# Patient Record
Sex: MALE | Race: WHITE | Employment: OTHER
[De-identification: names, ages, dates, MRNs, and addresses within clinical notes are randomized per-mention and may not be internally consistent; named-entity substitution may affect disease eponyms.]

---

## 2017-02-23 ENCOUNTER — TELEPHONE (OUTPATIENT)
Dept: OTHER | Facility: CLINIC | Age: 82
End: 2017-02-23

## 2017-03-14 ENCOUNTER — OFFICE VISIT (OUTPATIENT)
Dept: INTERNAL MEDICINE | Age: 82
End: 2017-03-14

## 2017-03-14 ENCOUNTER — TELEPHONE (OUTPATIENT)
Dept: INTERNAL MEDICINE | Age: 82
End: 2017-03-14

## 2017-03-14 VITALS
SYSTOLIC BLOOD PRESSURE: 128 MMHG | HEIGHT: 67 IN | DIASTOLIC BLOOD PRESSURE: 74 MMHG | WEIGHT: 145 LBS | BODY MASS INDEX: 22.76 KG/M2

## 2017-03-14 DIAGNOSIS — E11.65 TYPE 2 DIABETES MELLITUS WITH HYPERGLYCEMIA, WITH LONG-TERM CURRENT USE OF INSULIN (HCC): Primary | ICD-10-CM

## 2017-03-14 DIAGNOSIS — Z79.4 TYPE 2 DIABETES MELLITUS WITH HYPERGLYCEMIA, WITH LONG-TERM CURRENT USE OF INSULIN (HCC): Primary | ICD-10-CM

## 2017-03-14 DIAGNOSIS — I10 ESSENTIAL HYPERTENSION: ICD-10-CM

## 2017-03-14 DIAGNOSIS — E78.2 MIXED HYPERLIPIDEMIA: ICD-10-CM

## 2017-03-14 PROCEDURE — 4040F PNEUMOC VAC/ADMIN/RCVD: CPT | Performed by: INTERNAL MEDICINE

## 2017-03-14 PROCEDURE — G8484 FLU IMMUNIZE NO ADMIN: HCPCS | Performed by: INTERNAL MEDICINE

## 2017-03-14 PROCEDURE — 99214 OFFICE O/P EST MOD 30 MIN: CPT | Performed by: INTERNAL MEDICINE

## 2017-03-14 PROCEDURE — G8419 CALC BMI OUT NRM PARAM NOF/U: HCPCS | Performed by: INTERNAL MEDICINE

## 2017-03-14 PROCEDURE — 1036F TOBACCO NON-USER: CPT | Performed by: INTERNAL MEDICINE

## 2017-03-14 PROCEDURE — 1123F ACP DISCUSS/DSCN MKR DOCD: CPT | Performed by: INTERNAL MEDICINE

## 2017-03-14 PROCEDURE — G8427 DOCREV CUR MEDS BY ELIG CLIN: HCPCS | Performed by: INTERNAL MEDICINE

## 2017-03-14 ASSESSMENT — ENCOUNTER SYMPTOMS
EYE REDNESS: 0
NAUSEA: 0
BACK PAIN: 0
ABDOMINAL PAIN: 0
SHORTNESS OF BREATH: 0
CHEST TIGHTNESS: 0

## 2017-03-14 ASSESSMENT — PATIENT HEALTH QUESTIONNAIRE - PHQ9
SUM OF ALL RESPONSES TO PHQ QUESTIONS 1-9: 0
SUM OF ALL RESPONSES TO PHQ9 QUESTIONS 1 & 2: 0
1. LITTLE INTEREST OR PLEASURE IN DOING THINGS: 0
2. FEELING DOWN, DEPRESSED OR HOPELESS: 0

## 2017-06-14 ENCOUNTER — OFFICE VISIT (OUTPATIENT)
Dept: INTERNAL MEDICINE | Age: 82
End: 2017-06-14

## 2017-06-14 VITALS
BODY MASS INDEX: 20.72 KG/M2 | DIASTOLIC BLOOD PRESSURE: 84 MMHG | WEIGHT: 132 LBS | SYSTOLIC BLOOD PRESSURE: 122 MMHG | HEIGHT: 67 IN

## 2017-06-14 DIAGNOSIS — I10 ESSENTIAL HYPERTENSION: ICD-10-CM

## 2017-06-14 DIAGNOSIS — R53.83 OTHER FATIGUE: ICD-10-CM

## 2017-06-14 DIAGNOSIS — E11.65 TYPE 2 DIABETES MELLITUS WITH HYPERGLYCEMIA, WITH LONG-TERM CURRENT USE OF INSULIN (HCC): Primary | ICD-10-CM

## 2017-06-14 DIAGNOSIS — Z79.4 TYPE 2 DIABETES MELLITUS WITH HYPERGLYCEMIA, WITH LONG-TERM CURRENT USE OF INSULIN (HCC): Primary | ICD-10-CM

## 2017-06-14 DIAGNOSIS — R63.4 WEIGHT LOSS, ABNORMAL: ICD-10-CM

## 2017-06-14 DIAGNOSIS — E78.2 MIXED HYPERLIPIDEMIA: ICD-10-CM

## 2017-06-14 DIAGNOSIS — R97.20 ABNORMAL PSA: ICD-10-CM

## 2017-06-14 PROCEDURE — 1036F TOBACCO NON-USER: CPT | Performed by: INTERNAL MEDICINE

## 2017-06-14 PROCEDURE — G8427 DOCREV CUR MEDS BY ELIG CLIN: HCPCS | Performed by: INTERNAL MEDICINE

## 2017-06-14 PROCEDURE — G8420 CALC BMI NORM PARAMETERS: HCPCS | Performed by: INTERNAL MEDICINE

## 2017-06-14 PROCEDURE — 4040F PNEUMOC VAC/ADMIN/RCVD: CPT | Performed by: INTERNAL MEDICINE

## 2017-06-14 PROCEDURE — 1123F ACP DISCUSS/DSCN MKR DOCD: CPT | Performed by: INTERNAL MEDICINE

## 2017-06-14 PROCEDURE — 99214 OFFICE O/P EST MOD 30 MIN: CPT | Performed by: INTERNAL MEDICINE

## 2017-06-14 ASSESSMENT — ENCOUNTER SYMPTOMS
ABDOMINAL PAIN: 0
BACK PAIN: 0
NAUSEA: 0
SHORTNESS OF BREATH: 0
CHEST TIGHTNESS: 0
EYE REDNESS: 0

## 2017-06-16 LAB
A/G RATIO: 1.3 (CALC) (ref 1–2.5)
ALBUMIN SERPL-MCNC: 4 G/DL (ref 3.6–5.1)
ALP BLD-CCNC: 79 U/L (ref 40–115)
ALT SERPL-CCNC: 17 U/L (ref 9–46)
AMORPHOUS: ABNORMAL /HPF
AST SERPL-CCNC: 25 U/L (ref 10–35)
ATYPICAL LYMPHOCYTE RELATIVE PERCENT: ABNORMAL % (ref 0–10)
BACTERIA: ABNORMAL /HPF
BAND NEUTROPHILS: ABNORMAL %
BANDED NEUTROPHILS ABSOLUTE COUNT: ABNORMAL CELLS/UL (ref 0–750)
BASOPHILS ABSOLUTE: 68 CELLS/UL (ref 0–200)
BASOPHILS RELATIVE PERCENT: 0.9 %
BILIRUB SERPL-MCNC: 0.8 MG/DL (ref 0.2–1.2)
BILIRUBIN URINE: NEGATIVE
BLASTS ABSOLUTE COUNT: ABNORMAL CELLS/UL
BLASTS RELATIVE PERCENT: ABNORMAL %
BLOOD, URINE: NEGATIVE
BUN / CREAT RATIO: ABNORMAL (CALC) (ref 6–22)
BUN BLDV-MCNC: 14 MG/DL (ref 7–25)
CALCIUM OXALATE CRYSTALS: ABNORMAL /HPF
CALCIUM SERPL-MCNC: 9.2 MG/DL (ref 8.6–10.3)
CASTS: ABNORMAL /LPF
CHLORIDE BLD-SCNC: 97 MMOL/L (ref 98–110)
CLARITY: CLEAR
CO2: 31 MMOL/L (ref 20–31)
COLOR: YELLOW
COMMENT: ABNORMAL
CREAT SERPL-MCNC: 1.11 MG/DL (ref 0.7–1.11)
CREATININE URINE: 101 MG/DL (ref 20–370)
CRYSTALS: ABNORMAL /HPF
EOSINOPHILS ABSOLUTE: 129 CELLS/UL (ref 15–500)
EOSINOPHILS RELATIVE PERCENT: 1.7 %
EPITHELIAL CELLS, UA: ABNORMAL /HPF
GFR AFRICAN AMERICAN: 72 ML/MIN/1.73M2
GFR SERPL CREATININE-BSD FRML MDRD: 62 ML/MIN/1.73M2
GLOBULIN: 3.1 G/DL (CALC) (ref 1.9–3.7)
GLUCOSE BLD-MCNC: 262 MG/DL (ref 65–99)
GLUCOSE URINE: ABNORMAL
GRANULAR CASTS: ABNORMAL /LPF
HBA1C MFR BLD: 8.3 % OF TOTAL HGB
HCT VFR BLD CALC: 38 % (ref 38.5–50)
HEMOGLOBIN: 12.6 G/DL (ref 13.2–17.1)
HYALINE CASTS: ABNORMAL /LPF
KETONES, URINE: NEGATIVE
LEUKOCYTE ESTERASE, URINE: NEGATIVE
LEUKOCYTES, UA: ABNORMAL /HPF
LYMPHOCYTES ABSOLUTE: 1672 CELLS/UL (ref 850–3900)
LYMPHOCYTES RELATIVE PERCENT: 22 %
MCH RBC QN AUTO: 29.7 PG (ref 27–33)
MCHC RBC AUTO-ENTMCNC: 33.2 G/DL (ref 32–36)
MCV RBC AUTO: 89.6 FL (ref 80–100)
METAMYELOCYTES ABSOLUTE COUNT: ABNORMAL CELLS/UL
METAMYELOCYTES RELATIVE PERCENT: ABNORMAL %
MICROALBUMIN UR-MCNC: 24.8 MG/DL
MICROALBUMIN/CREAT UR-RTO: 246 MCG/MG CREAT
MONOCYTES ABSOLUTE: 646 CELLS/UL (ref 200–950)
MONOCYTES RELATIVE PERCENT: 8.5 %
MYELOCYTE PERCENT: ABNORMAL %
MYELOCYTES ABSOLUTE COUNT: ABNORMAL CELLS/UL
NEUTROPHILS ABSOLUTE: 5084 CELLS/UL (ref 1500–7800)
NITRITE, URINE: NEGATIVE
NUCLEATED RED BLOOD CELLS: ABNORMAL /100 WBC
NUCLEATED RED BLOOD CELLS: ABNORMAL CELLS/UL
PDW BLD-RTO: 12.4 % (ref 11–15)
PH UA: 5.5 (ref 5–8)
PLATELET # BLD: 172 THOUSAND/UL (ref 140–400)
PMV BLD AUTO: 11.4 FL (ref 7.5–12.5)
POTASSIUM SERPL-SCNC: 4.6 MMOL/L (ref 3.5–5.3)
PROMYELOCYTES ABSOLUTE COUNT: ABNORMAL CELLS/UL
PROMYELOCYTES PERCENT: ABNORMAL %
PROSTATE SPECIFIC ANTIGEN: 0.2 NG/ML
PROTEIN UA: ABNORMAL
RBC # BLD: 4.24 MILLION/UL (ref 4.2–5.8)
RBC UA: ABNORMAL /HPF
REDUCING SUBSTANCES, URINE: ABNORMAL %
RENAL EPITHELIAL, POC: ABNORMAL /HPF
SEGMENTED NEUTROPHILS RELATIVE PERCENT: 66.9 %
SODIUM BLD-SCNC: 135 MMOL/L (ref 135–146)
SPECIFIC GRAVITY UA: 1.02 (ref 1–1.03)
T4 FREE: 1 NG/DL (ref 0.8–1.8)
TOTAL PROTEIN: 7.1 G/DL (ref 6.1–8.1)
TRANSITIONAL EPITHELIAL: ABNORMAL /HPF
TRIPLE PHOSPHATE CRYSTALS: ABNORMAL /HPF
TSH ULTRASENSITIVE: 2.81 MIU/L (ref 0.4–4.5)
URATE CRYSTALS, URINE: ABNORMAL /HPF
WBC # BLD: 7.6 THOUSAND/UL (ref 3.8–10.8)
YEAST: ABNORMAL /HPF

## 2017-09-19 ENCOUNTER — OFFICE VISIT (OUTPATIENT)
Dept: INTERNAL MEDICINE | Age: 82
End: 2017-09-19

## 2017-09-19 VITALS
HEART RATE: 53 BPM | WEIGHT: 128.6 LBS | BODY MASS INDEX: 19.49 KG/M2 | OXYGEN SATURATION: 98 % | DIASTOLIC BLOOD PRESSURE: 70 MMHG | RESPIRATION RATE: 16 BRPM | HEIGHT: 68 IN | SYSTOLIC BLOOD PRESSURE: 120 MMHG

## 2017-09-19 DIAGNOSIS — E78.2 MIXED HYPERLIPIDEMIA: ICD-10-CM

## 2017-09-19 DIAGNOSIS — Z23 NEED FOR INFLUENZA VACCINATION: ICD-10-CM

## 2017-09-19 DIAGNOSIS — I10 ESSENTIAL HYPERTENSION: ICD-10-CM

## 2017-09-19 DIAGNOSIS — H61.21 IMPACTED CERUMEN OF RIGHT EAR: ICD-10-CM

## 2017-09-19 DIAGNOSIS — Z79.4 TYPE 2 DIABETES MELLITUS WITH HYPERGLYCEMIA, WITH LONG-TERM CURRENT USE OF INSULIN (HCC): Primary | ICD-10-CM

## 2017-09-19 DIAGNOSIS — E11.65 TYPE 2 DIABETES MELLITUS WITH HYPERGLYCEMIA, WITH LONG-TERM CURRENT USE OF INSULIN (HCC): Primary | ICD-10-CM

## 2017-09-19 PROCEDURE — 90662 IIV NO PRSV INCREASED AG IM: CPT | Performed by: INTERNAL MEDICINE

## 2017-09-19 PROCEDURE — 1036F TOBACCO NON-USER: CPT | Performed by: INTERNAL MEDICINE

## 2017-09-19 PROCEDURE — 4040F PNEUMOC VAC/ADMIN/RCVD: CPT | Performed by: INTERNAL MEDICINE

## 2017-09-19 PROCEDURE — G8420 CALC BMI NORM PARAMETERS: HCPCS | Performed by: INTERNAL MEDICINE

## 2017-09-19 PROCEDURE — 1123F ACP DISCUSS/DSCN MKR DOCD: CPT | Performed by: INTERNAL MEDICINE

## 2017-09-19 PROCEDURE — G0008 ADMIN INFLUENZA VIRUS VAC: HCPCS | Performed by: INTERNAL MEDICINE

## 2017-09-19 PROCEDURE — 99214 OFFICE O/P EST MOD 30 MIN: CPT | Performed by: INTERNAL MEDICINE

## 2017-09-19 PROCEDURE — G8427 DOCREV CUR MEDS BY ELIG CLIN: HCPCS | Performed by: INTERNAL MEDICINE

## 2017-09-19 ASSESSMENT — ENCOUNTER SYMPTOMS
BACK PAIN: 0
NAUSEA: 0
ABDOMINAL PAIN: 0
CHEST TIGHTNESS: 0
EYE REDNESS: 0
SHORTNESS OF BREATH: 0

## 2017-09-23 LAB
BUN / CREAT RATIO: ABNORMAL (CALC) (ref 6–22)
BUN BLDV-MCNC: 15 MG/DL (ref 7–25)
CALCIUM SERPL-MCNC: 8.8 MG/DL (ref 8.6–10.3)
CHLORIDE BLD-SCNC: 97 MMOL/L (ref 98–110)
CO2: 29 MMOL/L (ref 20–31)
CREAT SERPL-MCNC: 1.11 MG/DL (ref 0.7–1.11)
GFR AFRICAN AMERICAN: 72 ML/MIN/1.73M2
GFR SERPL CREATININE-BSD FRML MDRD: 62 ML/MIN/1.73M2
GLUCOSE BLD-MCNC: 251 MG/DL (ref 65–99)
HBA1C MFR BLD: 8.3 % OF TOTAL HGB
POTASSIUM SERPL-SCNC: 3.9 MMOL/L (ref 3.5–5.3)
SODIUM BLD-SCNC: 136 MMOL/L (ref 135–146)

## 2017-09-27 RX ORDER — LEVOTHYROXINE SODIUM 0.05 MG/1
TABLET ORAL
Qty: 90 TABLET | Refills: 2 | Status: SHIPPED | OUTPATIENT
Start: 2017-09-27 | End: 2018-01-01

## 2017-10-12 ENCOUNTER — OFFICE VISIT (OUTPATIENT)
Dept: INTERNAL MEDICINE | Age: 82
End: 2017-10-12

## 2017-10-12 VITALS
WEIGHT: 124 LBS | HEIGHT: 67 IN | SYSTOLIC BLOOD PRESSURE: 128 MMHG | DIASTOLIC BLOOD PRESSURE: 78 MMHG | BODY MASS INDEX: 19.46 KG/M2

## 2017-10-12 DIAGNOSIS — J44.9 COPD, MODERATE (HCC): Primary | ICD-10-CM

## 2017-10-12 PROCEDURE — G8420 CALC BMI NORM PARAMETERS: HCPCS | Performed by: NURSE PRACTITIONER

## 2017-10-12 PROCEDURE — 4040F PNEUMOC VAC/ADMIN/RCVD: CPT | Performed by: NURSE PRACTITIONER

## 2017-10-12 PROCEDURE — G8427 DOCREV CUR MEDS BY ELIG CLIN: HCPCS | Performed by: NURSE PRACTITIONER

## 2017-10-12 PROCEDURE — G8926 SPIRO NO PERF OR DOC: HCPCS | Performed by: NURSE PRACTITIONER

## 2017-10-12 PROCEDURE — 3023F SPIROM DOC REV: CPT | Performed by: NURSE PRACTITIONER

## 2017-10-12 PROCEDURE — 99213 OFFICE O/P EST LOW 20 MIN: CPT | Performed by: NURSE PRACTITIONER

## 2017-10-12 PROCEDURE — G8484 FLU IMMUNIZE NO ADMIN: HCPCS | Performed by: NURSE PRACTITIONER

## 2017-10-12 PROCEDURE — 1123F ACP DISCUSS/DSCN MKR DOCD: CPT | Performed by: NURSE PRACTITIONER

## 2017-10-12 PROCEDURE — 1036F TOBACCO NON-USER: CPT | Performed by: NURSE PRACTITIONER

## 2017-10-12 RX ORDER — ALBUTEROL SULFATE 90 UG/1
2 AEROSOL, METERED RESPIRATORY (INHALATION) EVERY 6 HOURS PRN
Qty: 1 INHALER | Refills: 3 | Status: SHIPPED | OUTPATIENT
Start: 2017-10-12

## 2017-10-12 RX ORDER — BUDESONIDE AND FORMOTEROL FUMARATE DIHYDRATE 160; 4.5 UG/1; UG/1
2 AEROSOL RESPIRATORY (INHALATION) 2 TIMES DAILY
Qty: 1 INHALER | Refills: 3 | Status: SHIPPED | OUTPATIENT
Start: 2017-10-12 | End: 2018-01-01 | Stop reason: ALTCHOICE

## 2017-10-12 ASSESSMENT — ENCOUNTER SYMPTOMS
SINUS PAIN: 0
COUGH: 1
SHORTNESS OF BREATH: 1
WHEEZING: 0
SORE THROAT: 0
SINUS PRESSURE: 0
RHINORRHEA: 0

## 2017-10-12 NOTE — PROGRESS NOTES
Left Arm   Position: Sitting   Cuff Size: Medium Adult   Weight: 124 lb (56.2 kg)   Height: 5' 7\" (1.702 m)     Physical Exam   Constitutional: He appears well-developed and well-nourished. Cardiovascular: Normal rate, regular rhythm and normal heart sounds. Pulmonary/Chest: He has decreased breath sounds in the right upper field, the right middle field, the right lower field, the left upper field, the left middle field and the left lower field. Abdominal: Soft. Skin: Skin is warm and dry. Current Outpatient Prescriptions   Medication Sig Dispense Refill    budesonide-formoterol (SYMBICORT) 160-4.5 MCG/ACT AERO Inhale 2 puffs into the lungs 2 times daily 1 Inhaler 3    albuterol sulfate HFA (PROAIR HFA) 108 (90 Base) MCG/ACT inhaler Inhale 2 puffs into the lungs every 6 hours as needed for Wheezing 1 Inhaler 3    levothyroxine (SYNTHROID) 50 MCG tablet TAKE 1 TABLET BY MOUTH DAILY 90 tablet 2    hydrochlorothiazide (HYDRODIURIL) 25 MG tablet TAKE 1 TABLET BY MOUTH DAILY 90 tablet 3    insulin glargine (TOUJEO SOLOSTAR) 300 UNIT/ML injection pen Inject 4 Units into the skin nightly      clopidogrel (PLAVIX) 75 MG tablet TAKE 1 TABLET BY MOUTH DAILY 90 tablet 3    losartan (COZAAR) 100 MG tablet TAKE 1 TABLET BY MOUTH DAILY 90 tablet 3    SPIRIVA HANDIHALER 18 MCG inhalation capsule INHALE CONTENTS OF 1 CAPSULE VIA HANDIHALER DAILY 90 capsule 3    carvedilol (COREG) 6.25 MG tablet TAKE 1 TABLET BY MOUTH TWICE DAILY WITH MEALS 180 tablet 11    pravastatin (PRAVACHOL) 40 MG tablet TAKE 1 TABLET BY MOUTH EVERY EVENING 90 tablet 3    hydrALAZINE (APRESOLINE) 50 MG tablet Take 1 tablet by mouth 3 times daily. (Patient taking differently: Take 25 mg by mouth 2 times daily ) 90 tablet 3    insulin lispro (HUMALOG) 100 UNIT/ML injection Inject 5 Units into the skin 2 times daily (with meals) Sliding scale       No current facility-administered medications for this visit. Assessment:     1.  COPD,

## 2017-10-16 RX ORDER — CHOLESTYRAMINE 4 G/9G
POWDER, FOR SUSPENSION ORAL
Qty: 1 PACKET | Refills: 5 | Status: SHIPPED | OUTPATIENT
Start: 2017-10-16 | End: 2017-11-16 | Stop reason: HOSPADM

## 2017-10-27 RX ORDER — HYDROCHLOROTHIAZIDE 25 MG/1
TABLET ORAL
Qty: 90 TABLET | Refills: 2 | Status: SHIPPED | OUTPATIENT
Start: 2017-10-27 | End: 2018-01-01 | Stop reason: SDUPTHER

## 2017-11-07 ENCOUNTER — OFFICE VISIT (OUTPATIENT)
Dept: INTERNAL MEDICINE | Age: 82
End: 2017-11-07

## 2017-11-07 VITALS
HEART RATE: 60 BPM | WEIGHT: 116.8 LBS | SYSTOLIC BLOOD PRESSURE: 90 MMHG | RESPIRATION RATE: 12 BRPM | DIASTOLIC BLOOD PRESSURE: 68 MMHG | OXYGEN SATURATION: 98 % | BODY MASS INDEX: 18.29 KG/M2

## 2017-11-07 DIAGNOSIS — E46 PROTEIN-CALORIE MALNUTRITION, UNSPECIFIED SEVERITY (HCC): ICD-10-CM

## 2017-11-07 DIAGNOSIS — E03.4 HYPOTHYROIDISM DUE TO ACQUIRED ATROPHY OF THYROID: ICD-10-CM

## 2017-11-07 DIAGNOSIS — Z79.4 TYPE 2 DIABETES MELLITUS WITH HYPERGLYCEMIA, WITH LONG-TERM CURRENT USE OF INSULIN (HCC): Primary | ICD-10-CM

## 2017-11-07 DIAGNOSIS — E11.65 TYPE 2 DIABETES MELLITUS WITH HYPERGLYCEMIA, WITH LONG-TERM CURRENT USE OF INSULIN (HCC): Primary | ICD-10-CM

## 2017-11-07 DIAGNOSIS — I10 ESSENTIAL HYPERTENSION: ICD-10-CM

## 2017-11-07 DIAGNOSIS — R53.83 OTHER FATIGUE: ICD-10-CM

## 2017-11-07 DIAGNOSIS — F32.A DEPRESSION, UNSPECIFIED DEPRESSION TYPE: ICD-10-CM

## 2017-11-07 PROCEDURE — G8484 FLU IMMUNIZE NO ADMIN: HCPCS | Performed by: INTERNAL MEDICINE

## 2017-11-07 PROCEDURE — G8427 DOCREV CUR MEDS BY ELIG CLIN: HCPCS | Performed by: INTERNAL MEDICINE

## 2017-11-07 PROCEDURE — 1036F TOBACCO NON-USER: CPT | Performed by: INTERNAL MEDICINE

## 2017-11-07 PROCEDURE — 99214 OFFICE O/P EST MOD 30 MIN: CPT | Performed by: INTERNAL MEDICINE

## 2017-11-07 PROCEDURE — G8419 CALC BMI OUT NRM PARAM NOF/U: HCPCS | Performed by: INTERNAL MEDICINE

## 2017-11-07 PROCEDURE — 4040F PNEUMOC VAC/ADMIN/RCVD: CPT | Performed by: INTERNAL MEDICINE

## 2017-11-07 PROCEDURE — 1123F ACP DISCUSS/DSCN MKR DOCD: CPT | Performed by: INTERNAL MEDICINE

## 2017-11-07 RX ORDER — ESCITALOPRAM OXALATE 5 MG/1
5 TABLET ORAL DAILY
Qty: 30 TABLET | Refills: 3 | Status: SHIPPED | OUTPATIENT
Start: 2017-11-07 | End: 2018-01-01 | Stop reason: SDUPTHER

## 2017-11-07 RX ORDER — PRAVASTATIN SODIUM 40 MG
TABLET ORAL
Qty: 90 TABLET | Refills: 3 | Status: SHIPPED | OUTPATIENT
Start: 2017-11-07 | End: 2018-01-01

## 2017-11-07 ASSESSMENT — ENCOUNTER SYMPTOMS
SHORTNESS OF BREATH: 0
BACK PAIN: 0
ABDOMINAL PAIN: 0
CHEST TIGHTNESS: 0
NAUSEA: 0
EYE REDNESS: 0

## 2017-11-07 NOTE — PROGRESS NOTES
Subjective:      Patient ID: Albert Payne is a 81 y.o. male. Chief Complaint   Patient presents with    Fatigue     sleeping alot, laying down during the day, no walking for exercise, can't breathe, seems depressed     Weight Loss    Depression     HPI     He has been losing weight slowly over the last year or 2. 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 As he has lost weight he's become somewhat depressed. His wife says he does eat but he does not hold his weight. He denies polyuria or polydipsia. She does say that he is in the bathroom a lot. He does have some problem with loose stools. He has had poorly fitting dentures but they were recently repaired. Diabetes- A1C 8.3, denies polyuria, or polydipsia, no hypoglycemia, mild forgetfulness. Irritible, depressed, losing weight , eats pretty well , had his dentures relined, doesn't want to be left alone, doesn't like the dark. Hypertension- Stable on meds, no cp , some feeling of sob, sighing, no edema , no cough. Losartan and HCTZ.      Current Outpatient Prescriptions on File Prior to Visit   Medication Sig Dispense Refill    hydrochlorothiazide (HYDRODIURIL) 25 MG tablet TAKE 1 TABLET BY MOUTH DAILY 90 tablet 2    cholestyramine (QUESTRAN) 4 g packet TAKE 1 PACKET AS DIRECTED TWICE DAILY 1 packet 5    budesonide-formoterol (SYMBICORT) 160-4.5 MCG/ACT AERO Inhale 2 puffs into the lungs 2 times daily 1 Inhaler 3    albuterol sulfate HFA (PROAIR HFA) 108 (90 Base) MCG/ACT inhaler Inhale 2 puffs into the lungs every 6 hours as needed for Wheezing 1 Inhaler 3    levothyroxine (SYNTHROID) 50 MCG tablet TAKE 1 TABLET BY MOUTH DAILY 90 tablet 2    hydrochlorothiazide (HYDRODIURIL) 25 MG tablet TAKE 1 TABLET BY MOUTH DAILY 90 tablet 3    insulin glargine (TOUJEO SOLOSTAR) 300 UNIT/ML injection pen Inject 4 Units into the skin nightly      clopidogrel (PLAVIX) 75 MG tablet TAKE 1 TABLET BY MOUTH sounds are normal. He exhibits no distension and no mass. There is no tenderness. There is no rebound and no guarding. Musculoskeletal: He exhibits no edema. Neurological: He is alert and oriented to person, place, and time. A little forgetful    Skin: No rash noted. Psychiatric: He has a normal mood and affect. Assessment and plan       1. Type 2 diabetes mellitus with hyperglycemia, with long-term current use of insulin (HCC)  Chronic diabetes. Check diabetic labs. - Comprehensive Metabolic Panel; Future  - Hemoglobin A1C; Future    2. Essential hypertension  Stable hypertension. Check laboratories. - Comprehensive Metabolic Panel; Future    3. Hypothyroidism due to acquired atrophy of thyroid  History of hypothyroidism currently on therapy, recent tests stable. 4. Other fatigue  Worsening fatigue. Check laboratories. 5. Protein-calorie malnutrition, unspecified severity ([address]  Increased weight loss. Consultation with GI and evaluation of chemistries. - CBC; Future  - IRON AND TIBC; Future  - Roverto Bang MD ([place])    6. Depression, unspecified depression type  Increased depression. Start on Lexapro 5 mg daily. - escitalopram (LEXAPRO) 5 MG tablet; Take 1 tablet by mouth daily  Dispense: 30 tablet;  Refill:3

## 2017-11-09 LAB
A/G RATIO: 1.2 (CALC) (ref 1–2.5)
ALBUMIN SERPL-MCNC: 3.8 G/DL (ref 3.6–5.1)
ALP BLD-CCNC: 72 U/L (ref 40–115)
ALT SERPL-CCNC: 16 U/L (ref 9–46)
AST SERPL-CCNC: 21 U/L (ref 10–35)
ATYPICAL LYMPHOCYTE RELATIVE PERCENT: ABNORMAL % (ref 0–10)
BAND NEUTROPHILS: ABNORMAL %
BANDED NEUTROPHILS ABSOLUTE COUNT: ABNORMAL CELLS/UL (ref 0–750)
BASOPHILS ABSOLUTE: 70 CELLS/UL (ref 0–200)
BASOPHILS RELATIVE PERCENT: 0.7 %
BILIRUB SERPL-MCNC: 0.8 MG/DL (ref 0.2–1.2)
BLASTS ABSOLUTE COUNT: ABNORMAL CELLS/UL
BLASTS RELATIVE PERCENT: ABNORMAL %
BUN / CREAT RATIO: 14 (CALC) (ref 6–22)
BUN BLDV-MCNC: 16 MG/DL (ref 7–25)
CALCIUM SERPL-MCNC: 9.2 MG/DL (ref 8.6–10.3)
CHLORIDE BLD-SCNC: 99 MMOL/L (ref 98–110)
CO2: 31 MMOL/L (ref 20–31)
COMMENT: ABNORMAL
CREAT SERPL-MCNC: 1.17 MG/DL (ref 0.7–1.11)
EOSINOPHILS ABSOLUTE: 220 CELLS/UL (ref 15–500)
EOSINOPHILS RELATIVE PERCENT: 2.2 %
GFR AFRICAN AMERICAN: 67 ML/MIN/1.73M2
GFR SERPL CREATININE-BSD FRML MDRD: 58 ML/MIN/1.73M2
GLOBULIN: 3.2 G/DL (CALC) (ref 1.9–3.7)
GLUCOSE BLD-MCNC: 126 MG/DL (ref 65–99)
HBA1C MFR BLD: 7.9 % OF TOTAL HGB
HCT VFR BLD CALC: 37.5 % (ref 38.5–50)
HEMOGLOBIN: 13.1 G/DL (ref 13.2–17.1)
IRON SATURATION: 30 % (CALC) (ref 15–60)
IRON: 73 MCG/DL (ref 50–180)
LYMPHOCYTES ABSOLUTE: 1740 CELLS/UL (ref 850–3900)
LYMPHOCYTES RELATIVE PERCENT: 17.4 %
MCH RBC QN AUTO: 30.9 PG (ref 27–33)
MCHC RBC AUTO-ENTMCNC: 34.9 G/DL (ref 32–36)
MCV RBC AUTO: 88.4 FL (ref 80–100)
METAMYELOCYTES ABSOLUTE COUNT: ABNORMAL CELLS/UL
METAMYELOCYTES RELATIVE PERCENT: ABNORMAL %
MONOCYTES ABSOLUTE: 800 CELLS/UL (ref 200–950)
MONOCYTES RELATIVE PERCENT: 8 %
MYELOCYTE PERCENT: ABNORMAL %
MYELOCYTES ABSOLUTE COUNT: ABNORMAL CELLS/UL
NEUTROPHILS ABSOLUTE: 7170 CELLS/UL (ref 1500–7800)
NUCLEATED RED BLOOD CELLS: ABNORMAL /100 WBC
NUCLEATED RED BLOOD CELLS: ABNORMAL CELLS/UL
PDW BLD-RTO: 13.1 % (ref 11–15)
PLATELET # BLD: 178 THOUSAND/UL (ref 140–400)
PMV BLD AUTO: 11.8 FL (ref 7.5–12.5)
POTASSIUM SERPL-SCNC: 3.7 MMOL/L (ref 3.5–5.3)
PROMYELOCYTES ABSOLUTE COUNT: ABNORMAL CELLS/UL
PROMYELOCYTES PERCENT: ABNORMAL %
RBC # BLD: 4.24 MILLION/UL (ref 4.2–5.8)
SEGMENTED NEUTROPHILS RELATIVE PERCENT: 71.7 %
SODIUM BLD-SCNC: 138 MMOL/L (ref 135–146)
TOTAL IRON BINDING CAPACITY: 244 MCG/DL (CALC) (ref 250–425)
TOTAL PROTEIN: 7 G/DL (ref 6.1–8.1)
WBC # BLD: 10 THOUSAND/UL (ref 3.8–10.8)

## 2017-11-16 ENCOUNTER — HOSPITAL ENCOUNTER (OUTPATIENT)
Dept: ENDOSCOPY | Age: 82
Discharge: HOME | End: 2017-11-16
Attending: INTERNAL MEDICINE | Admitting: INTERNAL MEDICINE

## 2017-11-16 VITALS
DIASTOLIC BLOOD PRESSURE: 77 MMHG | SYSTOLIC BLOOD PRESSURE: 154 MMHG | BODY MASS INDEX: 17.58 KG/M2 | HEIGHT: 68 IN | OXYGEN SATURATION: 96 % | WEIGHT: 116 LBS | HEART RATE: 66 BPM | RESPIRATION RATE: 16 BRPM | TEMPERATURE: 97.4 F

## 2017-11-16 LAB
GLUCOSE BLD-MCNC: 150 MG/DL (ref 70–99)
PERFORMED ON: ABNORMAL

## 2017-11-16 RX ORDER — OMEPRAZOLE 40 MG/1
40 CAPSULE, DELAYED RELEASE ORAL DAILY
Qty: 30 CAPSULE | Refills: 3 | Status: SHIPPED | OUTPATIENT
Start: 2017-11-16 | End: 2018-01-01

## 2017-11-16 NOTE — H&P
Patient was examined and all pertinent data reviewed. Documentation was done in [facility]. Patient's condition was assessed to be stable for the procedure to proceed.     Arsh Zhang MD

## 2017-11-16 NOTE — PLAN OF CARE
ADMISSION PRE-PROCEDURE INTRA-PROCEDURE POST-PROCEDURE: RECOVERY/ DISCHARGE   ASSESSMENT &  EVALUATION CONSULTS [x] Verify patient identification, allergies, vital signs, NPO, IV, & SPO2  [x] Complete the CORRIE SCORE  [x] Consent form to treat signed  [x] History and Physical [x] Reassess patient after pre- procedure medication given  [x] GI physician evaluates pt  [x] Verify patient's name, allergies [x] Continuous monitoring of vital  signs, SPO2, LOC  [x] Emotional status  [x] Patient comfort level [x] Total system admission assessment with appropriate intervention  [x] Pain evaluation and management  [x] Discharge criteria met  [x] Discharge assessment with appropriate intervention  [x] Compare with pre-procedure status  [x] Discharge by appropriate physician   DIAGNOSTIC / TESTS [x]  Lab work ordered  [x]  Obtain and attach lab work to patient's chart  [x]  Report abnormals and F/U with physician [x] Assure needed test results are present [x] Diagnostic testing as indicated  [x] Obtained specimens sent to lab [x] Diagnostic testing as indicated    [x] Assess patient's gag reflex    MEDICATIONS [x]  Conscious sedation medications  explained to patient  [x]  Start IV per physician's order  and/or protocol  [x]  Verify compliance of the colon prep  []  Pre-procedure med. as ordered  [x] Verify compliance of colon prep. [x] Re-check IV access [x] Assist with administration of IV conscious sedation medication  [x] Start O2  per  nasal cannula, if needed   [x] IV fluids as indicated/ordered  [x] Administration of medications as ordered  [x] Medications as prescribed  [x] D/C O2 therapy as ordered   PROCEDURE/TREATMENT [x] Specific order by GI physician  [x] Specific procedure as scheduled  [x]  Verify procedure as ordered [x] Time out/procedure verification checklist complete.  [x] EGD/Colonoscopy and related procedures  [x] Assist physician with the procedure [x] Treatment as indicated   NUTRITION / DIET [x] NPO after midnight, as ordered [x] Verify NPO status [x] IV fluids as support [x] Clear liquids and/or ice chips as ordered  [x] Tolerating clear liquids  [x] Special diet as ordered  [x] D/C IV fluids   ACTIVITY  [x] Assess level of function  [x]  Specified by physician  [x]  Activity as tolerated [x] Position on left side [x] Position on left side and reposition patient as physician ordered [x] Gradually elevate HOB to restrepos position  [x] Position changes as patient tolerated  [x] Ambulate as pre-procedure   PATIENT / S.O. EDUCATION [x] Pre, Intra Post-procedure  teaching appropriate to procedure  [x] Conscious Sedation Teaching  [x] Pain Management - instructed [x] Encourage questions  [x] Clarify any concerns [x] Safety devices maintain to  prevent patient injury  [x] Assist and support patient  [x] Observe standard precautions [x] Physician confers with the family/S.O. [x] Short visit from family in [place] area  [x] Physician specific post-procedure orders  [x] S/S complications with proper [x]F/U; office visits F/U  [x] Review discharge instructions, medicine to family /S. O. [x] Medication/ special diet information given to family/ S.O. [x]  Copy of discharge instructions givn to family/S.O.   OUTCOME PLANNING  DISCHARGE PLAN [x] Patient/S. O. will verbalize understanding of admission procedure & expectations of outcome in realistic terms   [x] Patient verbalize the role of family/S. O. in plan of care  [x] Patient will have designated responsible person available for discharge.  [x] Patient will demonstrate an  understanding of the planned  procedure and its related procedures and conscious sedation [x] Patient will:  - receive services according to the           standards of care  - receive standards for conscious       sedation  - remain free of injury [x] Patient will:  - have stable vital signs based on Salty Score   - be pain free or tolerable, have no bleeding  - have minimal abdominal distention   - return to pre-procedure level of orientation   -  tolerate fluid with no nausea and vomiting  -  ambulate as pre-procedure ADL   - verbalize understanding of the discharge instructions     1 Wallace Gary

## 2017-11-16 NOTE — ANESTHESIA PRE-OP
1/27/14   Rebecca Mehta MD   insulin lispro (HUMALOG) 100 UNIT/ML injection Inject 5 Units into the skin 2 times daily (with meals) Sliding scale    Historical Provider, MD       Current medications:    Current Outpatient Prescriptions   Medication Sig Dispense Refill    escitalopram (LEXAPRO) 5 MG tablet Take 1 tablet by mouth daily 30 tablet 3    pravastatin (PRAVACHOL) 40 MG tablet TAKE 1 TABLET BY MOUTH EVERY EVENING 90 tablet 3    hydrochlorothiazide (HYDRODIURIL) 25 MG tablet TAKE 1 TABLET BY MOUTH DAILY 90 tablet 2    cholestyramine (QUESTRAN) 4 g packet TAKE 1 PACKET AS DIRECTED TWICE DAILY 1 packet 5    budesonide-formoterol (SYMBICORT) 160-4.5 MCG/ACT AERO Inhale 2 puffs into the lungs 2 times daily 1 Inhaler 3    albuterol sulfate HFA (PROAIR HFA) 108 (90 Base) MCG/ACT inhaler Inhale 2 puffs into the lungs every 6 hours as needed for Wheezing 1 Inhaler 3    levothyroxine (SYNTHROID) 50 MCG tablet TAKE 1 TABLET BY MOUTH DAILY 90 tablet 2    hydrochlorothiazide (HYDRODIURIL) 25 MG tablet TAKE 1 TABLET BY MOUTH DAILY 90 tablet 3    insulin glargine (TOUJEO SOLOSTAR) 300 UNIT/ML injection pen Inject 4 Units into the skin nightly      clopidogrel (PLAVIX) 75 MG tablet TAKE 1 TABLET BY MOUTH DAILY 90 tablet 3    losartan (COZAAR) 100 MG tablet TAKE 1 TABLET BY MOUTH DAILY 90 tablet 3    SPIRIVA HANDIHALER 18 MCG inhalation capsule INHALE CONTENTS OF 1 CAPSULE VIA HANDIHALER DAILY 90 capsule 3    carvedilol (COREG) 6.25 MG tablet TAKE 1 TABLET BY MOUTH TWICE DAILY WITH MEALS 180 tablet 11    hydrALAZINE (APRESOLINE) 50 MG tablet Take 1 tablet by mouth 3 times daily. (Patient taking differently: Take 25 mg by mouth 2 times daily ) 90 tablet 3    insulin lispro (HUMALOG) 100 UNIT/ML injection Inject 5 Units into the skin 2 times daily (with meals) Sliding scale       No current facility-administered medications for this encounter. Allergies:     Allergies   Allergen Reactions    Morphine And Related [Codeine] Nausea Only and Other (See Comments)     With morphine pt hallucinates         Problem List:    Patient Active Problem List   Diagnosis Code    Cholelithiasis K80.20    Type 2 diabetes mellitus with hyperglycemia ([facility] E11.65    Essential hypertension I10    Skin lesion L98.9    Hypothyroidism due to acquired atrophy of thyroid E03.4    Well adult exam Z00.00    Mild cognitive impairment G31.84    Excessive cerumen in ear canal H61.20    Hearing loss H91.90    Vitamin D deficiency E55.9    Mixed hyperlipidemia E78.2    Weight loss, abnormal R63.4    Vitamin B 12 deficiency E53.8       Past Medical History:        Diagnosis Date    COPD (chronic obstructive pulmonary disease) ([facility]     Diabetes mellitus ([facility] [address] [facility] 2007    Hyperlipidemia     Hypertension     Necrotizing pancreatitis 2007    Pancreatitis 2007       Past Surgical History:        Procedure Laterality Date    ABDOMEN SURGERY      PANCREAS SURGERY  2007    Dr. Lucas Doherty  2007       Social History:    Social History   Substance Use Topics    Smoking status: Former Smoker     Packs/day: 2.00     Years: 30.00     Types: Cigarettes     Quit date: 7/1/1980    Smokeless tobacco: Never Used    Alcohol use No                                Counseling given: Not Answered      Vital Signs (Current): There were no vitals filed for this visit. BP Readings from Last 3 Encounters:   11/07/17 90/68   10/12/17 128/78   09/19/17 120/70       NPO Status:  midnight                                                                               BMI:   Wt Readings from Last 3 Encounters:   11/07/17 116 lb 12.8 oz (53 kg)   10/12/17 124 lb (56.2 kg)   09/19/17 128 lb 9.6 oz (58.3 kg)     There is no height or weight on file to calculate BMI.     Anesthesia Evaluation   no history of anesthetic complications:   Airway: Mallampati: II  TM distance: >3 FB   Neck ROM: full  Mouth opening: > = 3 FB Dental:      Comment: Full upper plate  Lower edentulous    Pulmonary:                           ROS comment: Denies Asthma  +COPD  Denies Smoking   Cardiovascular:    (+) hypertension (took meds this AM):,          Beta Blocker:  Dose within 24 Hrs      ROS comment: Denies CP, SOA with moderate exercise     Neuro/Psych:   Negative Neuro/Psych ROS                 Comments: AD (mild impairment) GI/Hepatic/Renal:        (-) GERD and liver disease       Endo/Other:    (+) Type II DM, using insulin, hypothyroidism::., .                 Abdominal:           Vascular: negative vascular ROS. Anesthesia Plan      MAC     ASA 3       Induction: intravenous. Anesthetic plan and risks discussed with patient.                       Lincoln Sanders MD   11/16/2017

## 2017-11-20 ENCOUNTER — HOSPITAL ENCOUNTER (OUTPATIENT)
Dept: CT IMAGING | Age: 82
Discharge: HOME | End: 2017-11-20
Attending: INTERNAL MEDICINE | Admitting: INTERNAL MEDICINE

## 2017-11-20 DIAGNOSIS — R63.4 ABNORMAL WEIGHT LOSS: ICD-10-CM

## 2017-11-20 DIAGNOSIS — R63.4 LOSS OF WEIGHT: ICD-10-CM

## 2017-12-19 ENCOUNTER — OFFICE VISIT (OUTPATIENT)
Dept: INTERNAL MEDICINE | Age: 82
End: 2017-12-19

## 2017-12-19 VITALS
WEIGHT: 120 LBS | SYSTOLIC BLOOD PRESSURE: 156 MMHG | HEART RATE: 70 BPM | DIASTOLIC BLOOD PRESSURE: 90 MMHG | BODY MASS INDEX: 18.25 KG/M2 | OXYGEN SATURATION: 98 %

## 2017-12-19 DIAGNOSIS — E11.65 TYPE 2 DIABETES MELLITUS WITH HYPERGLYCEMIA, WITH LONG-TERM CURRENT USE OF INSULIN (HCC): Primary | ICD-10-CM

## 2017-12-19 DIAGNOSIS — K86.89 PANCREATIC INSUFFICIENCY: ICD-10-CM

## 2017-12-19 DIAGNOSIS — Z79.4 TYPE 2 DIABETES MELLITUS WITH HYPERGLYCEMIA, WITH LONG-TERM CURRENT USE OF INSULIN (HCC): Primary | ICD-10-CM

## 2017-12-19 PROCEDURE — G8419 CALC BMI OUT NRM PARAM NOF/U: HCPCS | Performed by: INTERNAL MEDICINE

## 2017-12-19 PROCEDURE — 99213 OFFICE O/P EST LOW 20 MIN: CPT | Performed by: INTERNAL MEDICINE

## 2017-12-19 PROCEDURE — G8484 FLU IMMUNIZE NO ADMIN: HCPCS | Performed by: INTERNAL MEDICINE

## 2017-12-19 PROCEDURE — 1036F TOBACCO NON-USER: CPT | Performed by: INTERNAL MEDICINE

## 2017-12-19 PROCEDURE — G8427 DOCREV CUR MEDS BY ELIG CLIN: HCPCS | Performed by: INTERNAL MEDICINE

## 2017-12-19 PROCEDURE — 4040F PNEUMOC VAC/ADMIN/RCVD: CPT | Performed by: INTERNAL MEDICINE

## 2017-12-19 PROCEDURE — 1123F ACP DISCUSS/DSCN MKR DOCD: CPT | Performed by: INTERNAL MEDICINE

## 2017-12-19 ASSESSMENT — ENCOUNTER SYMPTOMS
CHEST TIGHTNESS: 0
BACK PAIN: 0
ABDOMINAL PAIN: 0
NAUSEA: 0
EYE REDNESS: 0
SHORTNESS OF BREATH: 0

## 2017-12-19 NOTE — PROGRESS NOTES
(HYDRODIURIL) 25 MG tablet TAKE 1 TABLET BY MOUTH DAILY 90 tablet 2    SPIRIVA HANDIHALER 18 MCG inhalation capsule INHALE CONTENTS OF 1 CAPSULE VIA HANDIHALER DAILY 90 capsule 3    insulin lispro (HUMALOG) 100 UNIT/ML injection Inject 5 Units into the skin 2 times daily (with meals) Sliding scale       No current facility-administered medications on file prior to visit. Allergies   Allergen Reactions    Morphine And Related [Codeine] Nausea Only and Other (See Comments)     With morphine pt hallucinates         Review of Systems   Constitutional: Negative for fatigue, fever and unexpected weight change. HENT: Negative for hearing loss. Eyes: Negative for redness and visual disturbance. Respiratory: Negative for chest tightness and shortness of breath. Cardiovascular: Negative for chest pain and palpitations. Gastrointestinal: Negative for abdominal pain and nausea. Endocrine: Negative for polydipsia and polyuria. Genitourinary: Negative for dysuria and hematuria. Musculoskeletal: Negative for arthralgias, back pain and neck pain. Skin: Negative for rash and wound. Neurological: Negative for dizziness and headaches. Hematological: Negative for adenopathy. Does not bruise/bleed easily. Psychiatric/Behavioral: Negative for agitation. The patient is not nervous/anxious. Objective:   Physical Exam   Constitutional: He is oriented to person, place, and time. He appears well-developed and well-nourished. Cardiovascular: Normal rate, regular rhythm and normal heart sounds. No murmur heard. Pulmonary/Chest: Effort normal and breath sounds normal. He has no rales. Abdominal: Soft. Bowel sounds are normal.   Musculoskeletal: He exhibits no edema. Neurological: He is alert and oriented to person, place, and time. Skin: No rash noted. Assessment and plan       1. Type 2 diabetes mellitus with hyperglycemia, with long-term current use of insulin (HCC)  Stable.  Continue

## 2018-01-01 ENCOUNTER — APPOINTMENT (OUTPATIENT)
Dept: CT IMAGING | Age: 83
DRG: 871 | End: 2018-01-01
Payer: MEDICARE

## 2018-01-01 ENCOUNTER — TELEPHONE (OUTPATIENT)
Dept: INTERNAL MEDICINE CLINIC | Age: 83
End: 2018-01-01

## 2018-01-01 ENCOUNTER — HOSPITAL ENCOUNTER (INPATIENT)
Age: 83
LOS: 5 days | Discharge: NURSING, REHAB OR LONG-TERM CARE | DRG: 871 | End: 2018-11-09
Attending: EMERGENCY MEDICINE | Admitting: HOSPITALIST
Payer: MEDICARE

## 2018-01-01 ENCOUNTER — HOSPITAL ENCOUNTER (EMERGENCY)
Age: 83
Discharge: HOME | End: 2018-10-03
Attending: EMERGENCY MEDICINE
Payer: MEDICARE

## 2018-01-01 ENCOUNTER — TELEPHONE (OUTPATIENT)
Dept: INTERNAL MEDICINE | Age: 83
End: 2018-01-01

## 2018-01-01 ENCOUNTER — APPOINTMENT (OUTPATIENT)
Dept: GENERAL RADIOLOGY | Age: 83
End: 2018-01-01
Payer: MEDICARE

## 2018-01-01 ENCOUNTER — OFFICE VISIT (OUTPATIENT)
Dept: INTERNAL MEDICINE CLINIC | Age: 83
End: 2018-01-01
Payer: MEDICARE

## 2018-01-01 ENCOUNTER — OFFICE VISIT (OUTPATIENT)
Dept: INTERNAL MEDICINE | Age: 83
End: 2018-01-01

## 2018-01-01 ENCOUNTER — CARE COORDINATION (OUTPATIENT)
Dept: CASE MANAGEMENT | Age: 83
End: 2018-01-01

## 2018-01-01 ENCOUNTER — APPOINTMENT (OUTPATIENT)
Dept: GENERAL RADIOLOGY | Age: 83
DRG: 871 | End: 2018-01-01
Payer: MEDICARE

## 2018-01-01 ENCOUNTER — OFFICE VISIT (OUTPATIENT)
Dept: CARDIOLOGY CLINIC | Age: 83
End: 2018-01-01
Payer: MEDICARE

## 2018-01-01 VITALS
DIASTOLIC BLOOD PRESSURE: 64 MMHG | SYSTOLIC BLOOD PRESSURE: 122 MMHG | HEART RATE: 58 BPM | HEIGHT: 68 IN | OXYGEN SATURATION: 97 % | WEIGHT: 128.8 LBS | BODY MASS INDEX: 19.52 KG/M2

## 2018-01-01 VITALS
WEIGHT: 118.39 LBS | SYSTOLIC BLOOD PRESSURE: 154 MMHG | RESPIRATION RATE: 16 BRPM | DIASTOLIC BLOOD PRESSURE: 66 MMHG | HEART RATE: 80 BPM | OXYGEN SATURATION: 96 % | TEMPERATURE: 96.8 F | HEIGHT: 68 IN | BODY MASS INDEX: 17.94 KG/M2

## 2018-01-01 VITALS
BODY MASS INDEX: 18.19 KG/M2 | HEIGHT: 68 IN | RESPIRATION RATE: 16 BRPM | OXYGEN SATURATION: 98 % | WEIGHT: 120 LBS | SYSTOLIC BLOOD PRESSURE: 162 MMHG | TEMPERATURE: 98 F | HEART RATE: 64 BPM | DIASTOLIC BLOOD PRESSURE: 91 MMHG

## 2018-01-01 VITALS
DIASTOLIC BLOOD PRESSURE: 90 MMHG | BODY MASS INDEX: 18.49 KG/M2 | HEIGHT: 68 IN | HEART RATE: 64 BPM | WEIGHT: 122 LBS | TEMPERATURE: 97.4 F | OXYGEN SATURATION: 97 % | SYSTOLIC BLOOD PRESSURE: 138 MMHG | RESPIRATION RATE: 14 BRPM

## 2018-01-01 VITALS
BODY MASS INDEX: 19.7 KG/M2 | WEIGHT: 130 LBS | OXYGEN SATURATION: 95 % | HEIGHT: 68 IN | HEART RATE: 71 BPM | RESPIRATION RATE: 14 BRPM | SYSTOLIC BLOOD PRESSURE: 150 MMHG | DIASTOLIC BLOOD PRESSURE: 92 MMHG

## 2018-01-01 VITALS
SYSTOLIC BLOOD PRESSURE: 122 MMHG | WEIGHT: 118 LBS | BODY MASS INDEX: 17.88 KG/M2 | DIASTOLIC BLOOD PRESSURE: 70 MMHG | HEART RATE: 81 BPM | HEIGHT: 68 IN

## 2018-01-01 VITALS
BODY MASS INDEX: 18.79 KG/M2 | HEART RATE: 69 BPM | SYSTOLIC BLOOD PRESSURE: 124 MMHG | OXYGEN SATURATION: 97 % | DIASTOLIC BLOOD PRESSURE: 72 MMHG | HEIGHT: 68 IN | RESPIRATION RATE: 14 BRPM | WEIGHT: 124 LBS

## 2018-01-01 DIAGNOSIS — E03.4 HYPOTHYROIDISM DUE TO ACQUIRED ATROPHY OF THYROID: ICD-10-CM

## 2018-01-01 DIAGNOSIS — I10 ESSENTIAL HYPERTENSION: ICD-10-CM

## 2018-01-01 DIAGNOSIS — E11.65 TYPE 2 DIABETES MELLITUS WITH HYPERGLYCEMIA, WITH LONG-TERM CURRENT USE OF INSULIN (HCC): ICD-10-CM

## 2018-01-01 DIAGNOSIS — Z79.4 TYPE 2 DIABETES MELLITUS WITH HYPERGLYCEMIA, WITH LONG-TERM CURRENT USE OF INSULIN (HCC): ICD-10-CM

## 2018-01-01 DIAGNOSIS — E53.8 VITAMIN B 12 DEFICIENCY: ICD-10-CM

## 2018-01-01 DIAGNOSIS — J43.2 CENTRILOBULAR EMPHYSEMA (HCC): Primary | ICD-10-CM

## 2018-01-01 DIAGNOSIS — R00.2 PALPITATIONS: ICD-10-CM
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
## 2018-01-01 DIAGNOSIS — F32.9 REACTIVE DEPRESSION: Primary | ICD-10-CM

## 2018-01-01 DIAGNOSIS — Z00.00 WELL ADULT EXAM: Primary | ICD-10-CM

## 2018-01-01 DIAGNOSIS — G31.84 MILD COGNITIVE IMPAIRMENT: ICD-10-CM

## 2018-01-01 DIAGNOSIS — E87.6 HYPOKALEMIA: ICD-10-CM

## 2018-01-01 DIAGNOSIS — I48.0 PAROXYSMAL ATRIAL FIBRILLATION (HCC): Primary | ICD-10-CM

## 2018-01-01 DIAGNOSIS — R53.83 OTHER FATIGUE: ICD-10-CM

## 2018-01-01 DIAGNOSIS — J43.2 CENTRILOBULAR EMPHYSEMA (HCC): ICD-10-CM

## 2018-01-01 DIAGNOSIS — E78.2 MIXED HYPERLIPIDEMIA: ICD-10-CM

## 2018-01-01 DIAGNOSIS — Z23 NEED FOR INFLUENZA VACCINATION: ICD-10-CM

## 2018-01-01 DIAGNOSIS — E55.9 VITAMIN D DEFICIENCY: ICD-10-CM

## 2018-01-01 DIAGNOSIS — E86.0 DEHYDRATION: Primary | ICD-10-CM

## 2018-01-01 DIAGNOSIS — F33.41 RECURRENT MAJOR DEPRESSIVE DISORDER, IN PARTIAL REMISSION (HCC): ICD-10-CM

## 2018-01-01 DIAGNOSIS — K85.80 OTHER ACUTE PANCREATITIS, UNSPECIFIED COMPLICATION STATUS: ICD-10-CM

## 2018-01-01 DIAGNOSIS — F32.9 REACTIVE DEPRESSION: ICD-10-CM

## 2018-01-01 DIAGNOSIS — I48.0 PAROXYSMAL ATRIAL FIBRILLATION (HCC): ICD-10-CM

## 2018-01-01 DIAGNOSIS — J96.02 ACUTE HYPERCAPNIC RESPIRATORY FAILURE (HCC): Primary | ICD-10-CM

## 2018-01-01 DIAGNOSIS — E78.2 MIXED HYPERLIPIDEMIA: Primary | ICD-10-CM

## 2018-01-01 LAB
ALBUMIN SERPL-MCNC: 2.9 G/DL (ref 3.4–5)
ALBUMIN SERPL-MCNC: 3.3 G/DL (ref 3.4–5)
ALP BLD-CCNC: 82 U/L (ref 40–129)
ALT SERPL-CCNC: 7 U/L (ref 10–40)
ANION GAP SERPL CALCULATED.3IONS-SCNC: 10 MMOL/L (ref 3–16)
ANION GAP SERPL CALCULATED.3IONS-SCNC: 12 MMOL/L (ref 3–16)
ANION GAP SERPL CALCULATED.3IONS-SCNC: 13 MMOL/L (ref 3–16)
ANION GAP SERPL CALCULATED.3IONS-SCNC: 14 MMOL/L (ref 3–16)
ANION GAP SERPL CALCULATED.3IONS-SCNC: 7 MMOL/L (ref 3–16)
ANION GAP SERPL CALCULATED.3IONS-SCNC: 9 MMOL/L (ref 3–16)
ANION GAP SERPL CALCULATED.3IONS-SCNC: 9 MMOL/L (ref 3–16)
AST SERPL-CCNC: 18 U/L (ref 15–37)
B-TYPE NATRIURETIC PEPTIDE: 171 PG/ML (ref 0–99.9)
BASE EXCESS ARTERIAL: 6 (ref -3–3)
BASE EXCESS VENOUS: 2 (ref -3–3)
BASE EXCESS VENOUS: 8 (ref -3–3)
BASOPHILS ABSOLUTE: 0 K/UL (ref 0–0.2)
BASOPHILS ABSOLUTE: 0.1 K/UL (ref 0–0.2)
BASOPHILS ABSOLUTE: 0.1 K/UL (ref 0–0.2)
BASOPHILS RELATIVE PERCENT: 0.2 %
BASOPHILS RELATIVE PERCENT: 0.3 %
BASOPHILS RELATIVE PERCENT: 0.6 %
BASOPHILS RELATIVE PERCENT: 1.1 %
BILIRUB SERPL-MCNC: 0.8 MG/DL (ref 0–1)
BILIRUBIN DIRECT: <0.2 MG/DL (ref 0–0.3)
BILIRUBIN, INDIRECT: ABNORMAL MG/DL (ref 0–1)
BLOOD CULTURE, ROUTINE: NORMAL
BUN BLDV-MCNC: 14 MG/DL (ref 7–20)
BUN BLDV-MCNC: 15 MG/DL (ref 7–20)
BUN BLDV-MCNC: 18 MG/DL (ref 7–20)
BUN BLDV-MCNC: 20 MG/DL (ref 7–20)
CALCIUM IONIZED: 1.04 MMOL/L (ref 1.12–1.32)
CALCIUM IONIZED: 1.05 MMOL/L (ref 1.12–1.32)
CALCIUM SERPL-MCNC: 8.1 MG/DL (ref 8.3–10.6)
CALCIUM SERPL-MCNC: 8.2 MG/DL (ref 8.3–10.6)
CALCIUM SERPL-MCNC: 8.4 MG/DL (ref 8.3–10.6)
CALCIUM SERPL-MCNC: 8.8 MG/DL (ref 8.3–10.6)
CALCIUM SERPL-MCNC: 8.9 MG/DL (ref 8.3–10.6)
CHLORIDE BLD-SCNC: 87 MMOL/L (ref 99–110)
CHLORIDE BLD-SCNC: 90 MMOL/L (ref 99–110)
CHLORIDE BLD-SCNC: 92 MMOL/L (ref 99–110)
CHLORIDE BLD-SCNC: 92 MMOL/L (ref 99–110)
CHLORIDE BLD-SCNC: 95 MMOL/L (ref 99–110)
CHLORIDE BLD-SCNC: 96 MMOL/L (ref 99–110)
CHLORIDE BLD-SCNC: 97 MMOL/L (ref 99–110)
CO2: 25 MMOL/L (ref 21–32)
CO2: 26 MMOL/L (ref 21–32)
CO2: 28 MMOL/L (ref 21–32)
CO2: 30 MMOL/L (ref 21–32)
CO2: 31 MMOL/L (ref 21–32)
CO2: 32 MMOL/L (ref 21–32)
CO2: 33 MMOL/L (ref 21–32)
CO2: 33 MMOL/L (ref 21–32)
CREAT SERPL-MCNC: 0.8 MG/DL (ref 0.8–1.3)
CREAT SERPL-MCNC: 0.9 MG/DL (ref 0.8–1.3)
CULTURE, BLOOD 2: NORMAL
EKG ATRIAL RATE: 123 BPM
EKG ATRIAL RATE: 127 BPM
EKG DIAGNOSIS: NORMAL
EKG DIAGNOSIS: NORMAL
EKG P AXIS: 87 DEGREES
EKG P-R INTERVAL: 164 MS
EKG Q-T INTERVAL: 322 MS
EKG Q-T INTERVAL: 332 MS
EKG QRS DURATION: 76 MS
EKG QRS DURATION: 86 MS
EKG QTC CALCULATION (BAZETT): 471 MS
EKG QTC CALCULATION (BAZETT): 475 MS
EKG R AXIS: 44 DEGREES
EKG R AXIS: 68 DEGREES
EKG T AXIS: 104 DEGREES
EKG T AXIS: 74 DEGREES
EKG VENTRICULAR RATE: 123 BPM
EKG VENTRICULAR RATE: 129 BPM
EOSINOPHILS ABSOLUTE: 0 K/UL (ref 0–0.6)
EOSINOPHILS ABSOLUTE: 0.1 K/UL (ref 0–0.6)
EOSINOPHILS RELATIVE PERCENT: 0 %
EOSINOPHILS RELATIVE PERCENT: 0.1 %
EOSINOPHILS RELATIVE PERCENT: 0.3 %
EOSINOPHILS RELATIVE PERCENT: 0.4 %
EOSINOPHILS RELATIVE PERCENT: 0.9 %
EOSINOPHILS RELATIVE PERCENT: 1.5 %
ESTIMATED AVERAGE GLUCOSE: 174.3 MG/DL
FOLATE: 11.6 NG/ML (ref 4.78–24.2)
GFR AFRICAN AMERICAN: >60
GFR NON-AFRICAN AMERICAN: >60
GLUCOSE BLD-MCNC: 105 MG/DL (ref 70–99)
GLUCOSE BLD-MCNC: 116 MG/DL (ref 70–99)
GLUCOSE BLD-MCNC: 117 MG/DL (ref 70–99)
GLUCOSE BLD-MCNC: 119 MG/DL (ref 70–99)
GLUCOSE BLD-MCNC: 136 MG/DL (ref 70–99)
GLUCOSE BLD-MCNC: 140 MG/DL (ref 70–99)
GLUCOSE BLD-MCNC: 145 MG/DL (ref 70–99)
GLUCOSE BLD-MCNC: 155 MG/DL (ref 70–99)
GLUCOSE BLD-MCNC: 166 MG/DL (ref 70–99)
GLUCOSE BLD-MCNC: 172 MG/DL (ref 70–99)
GLUCOSE BLD-MCNC: 177 MG/DL (ref 70–99)
GLUCOSE BLD-MCNC: 183 MG/DL (ref 70–99)
GLUCOSE BLD-MCNC: 189 MG/DL (ref 70–99)
GLUCOSE BLD-MCNC: 210 MG/DL (ref 70–99)
GLUCOSE BLD-MCNC: 218 MG/DL (ref 70–99)
GLUCOSE BLD-MCNC: 234 MG/DL (ref 70–99)
GLUCOSE BLD-MCNC: 239 MG/DL (ref 70–99)
GLUCOSE BLD-MCNC: 248 MG/DL (ref 70–99)
GLUCOSE BLD-MCNC: 251 MG/DL (ref 70–99)
GLUCOSE BLD-MCNC: 251 MG/DL (ref 70–99)
GLUCOSE BLD-MCNC: 255 MG/DL (ref 70–99)
GLUCOSE BLD-MCNC: 260 MG/DL (ref 70–99)
GLUCOSE BLD-MCNC: 272 MG/DL (ref 70–99)
GLUCOSE BLD-MCNC: 294 MG/DL (ref 70–99)
GLUCOSE BLD-MCNC: 307 MG/DL (ref 70–99)
GLUCOSE BLD-MCNC: 307 MG/DL (ref 70–99)
GLUCOSE BLD-MCNC: 310 MG/DL (ref 70–99)
GLUCOSE BLD-MCNC: 326 MG/DL (ref 70–99)
GLUCOSE BLD-MCNC: 328 MG/DL (ref 70–99)
GLUCOSE BLD-MCNC: 329 MG/DL (ref 70–99)
GLUCOSE BLD-MCNC: 333 MG/DL (ref 70–99)
GLUCOSE BLD-MCNC: 47 MG/DL (ref 70–99)
GLUCOSE BLD-MCNC: 58 MG/DL (ref 70–99)
GLUCOSE BLD-MCNC: 61 MG/DL (ref 70–99)
GLUCOSE BLD-MCNC: 74 MG/DL (ref 70–99)
GLUCOSE BLD-MCNC: >600 MG/DL (ref 70–99)
HBA1C MFR BLD: 7.7 %
HCO3 ARTERIAL: 30.9 MMOL/L (ref 21–29)
HCO3 VENOUS: 28 MMOL/L (ref 23–29)
HCO3 VENOUS: 34.8 MMOL/L (ref 23–29)
HCT VFR BLD CALC: 29.5 % (ref 40.5–52.5)
HCT VFR BLD CALC: 29.6 % (ref 40.5–52.5)
HCT VFR BLD CALC: 30.8 % (ref 40.5–52.5)
HCT VFR BLD CALC: 34.9 % (ref 40.5–52.5)
HCT VFR BLD CALC: 35.1 % (ref 40.5–52.5)
HCT VFR BLD CALC: 36.2 % (ref 40.5–52.5)
HEMOGLOBIN: 10 G/DL (ref 13.5–17.5)
HEMOGLOBIN: 10.3 G/DL (ref 13.5–17.5)
HEMOGLOBIN: 11.3 G/DL (ref 13.5–17.5)
HEMOGLOBIN: 11.4 G/DL (ref 13.5–17.5)
HEMOGLOBIN: 11.9 G/DL (ref 13.5–17.5)
HEMOGLOBIN: 9.8 G/DL (ref 13.5–17.5)
L. PNEUMOPHILA SEROGP 1 UR AG: NORMAL
LACTATE: 1.57 MMOL/L (ref 0.4–2)
LACTATE: 2.07 MMOL/L (ref 0.4–2)
LACTATE: 3.02 MMOL/L (ref 0.4–2)
LACTIC ACID: 1.4 MMOL/L (ref 0.4–2)
LACTIC ACID: 3.3 MMOL/L (ref 0.4–2)
LIPASE: 5 U/L (ref 13–60)
LV EF: 50 %
LVEF MODALITY: NORMAL
LYMPHOCYTES ABSOLUTE: 0.6 K/UL (ref 1–5.1)
LYMPHOCYTES ABSOLUTE: 0.8 K/UL (ref 1–5.1)
LYMPHOCYTES ABSOLUTE: 0.9 K/UL (ref 1–5.1)
LYMPHOCYTES ABSOLUTE: 1 K/UL (ref 1–5.1)
LYMPHOCYTES ABSOLUTE: 1.1 K/UL (ref 1–5.1)
LYMPHOCYTES ABSOLUTE: 1.4 K/UL (ref 1–5.1)
LYMPHOCYTES RELATIVE PERCENT: 13 %
LYMPHOCYTES RELATIVE PERCENT: 3.5 %
LYMPHOCYTES RELATIVE PERCENT: 5.3 %
LYMPHOCYTES RELATIVE PERCENT: 6.7 %
LYMPHOCYTES RELATIVE PERCENT: 8 %
LYMPHOCYTES RELATIVE PERCENT: 9.7 %
MAGNESIUM: 1.3 MG/DL (ref 1.8–2.4)
MAGNESIUM: 1.5 MG/DL (ref 1.8–2.4)
MCH RBC QN AUTO: 28.1 PG (ref 26–34)
MCH RBC QN AUTO: 28.6 PG (ref 26–34)
MCH RBC QN AUTO: 29 PG (ref 26–34)
MCH RBC QN AUTO: 29.6 PG (ref 26–34)
MCHC RBC AUTO-ENTMCNC: 32.2 G/DL (ref 31–36)
MCHC RBC AUTO-ENTMCNC: 32.6 G/DL (ref 31–36)
MCHC RBC AUTO-ENTMCNC: 32.8 G/DL (ref 31–36)
MCHC RBC AUTO-ENTMCNC: 33.3 G/DL (ref 31–36)
MCHC RBC AUTO-ENTMCNC: 33.3 G/DL (ref 31–36)
MCHC RBC AUTO-ENTMCNC: 33.9 G/DL (ref 31–36)
MCV RBC AUTO: 86.2 FL (ref 80–100)
MCV RBC AUTO: 87.1 FL (ref 80–100)
MCV RBC AUTO: 87.2 FL (ref 80–100)
MCV RBC AUTO: 87.4 FL (ref 80–100)
MCV RBC AUTO: 88.5 FL (ref 80–100)
MCV RBC AUTO: 88.7 FL (ref 80–100)
MONOCYTES ABSOLUTE: 0.5 K/UL (ref 0–1.3)
MONOCYTES ABSOLUTE: 0.9 K/UL (ref 0–1.3)
MONOCYTES ABSOLUTE: 1 K/UL (ref 0–1.3)
MONOCYTES ABSOLUTE: 1 K/UL (ref 0–1.3)
MONOCYTES RELATIVE PERCENT: 11.1 %
MONOCYTES RELATIVE PERCENT: 3.4 %
MONOCYTES RELATIVE PERCENT: 6 %
MONOCYTES RELATIVE PERCENT: 6.4 %
MONOCYTES RELATIVE PERCENT: 6.9 %
MONOCYTES RELATIVE PERCENT: 8.4 %
NEUTROPHILS ABSOLUTE: 11.6 K/UL (ref 1.7–7.7)
NEUTROPHILS ABSOLUTE: 12.4 K/UL (ref 1.7–7.7)
NEUTROPHILS ABSOLUTE: 14.5 K/UL (ref 1.7–7.7)
NEUTROPHILS ABSOLUTE: 15 K/UL (ref 1.7–7.7)
NEUTROPHILS ABSOLUTE: 6.1 K/UL (ref 1.7–7.7)
NEUTROPHILS ABSOLUTE: 8.4 K/UL (ref 1.7–7.7)
NEUTROPHILS RELATIVE PERCENT: 73.3 %
NEUTROPHILS RELATIVE PERCENT: 82.4 %
NEUTROPHILS RELATIVE PERCENT: 82.9 %
NEUTROPHILS RELATIVE PERCENT: 85.9 %
NEUTROPHILS RELATIVE PERCENT: 88.4 %
NEUTROPHILS RELATIVE PERCENT: 92.9 %
O2 SAT, ARTERIAL: 98 % (ref 93–100)
O2 SAT, VEN: 57 %
O2 SAT, VEN: 78 %
PCO2 ARTERIAL: 49.6 MM HG (ref 35–45)
PCO2, VEN: 55.4 MM HG (ref 40–50)
PCO2, VEN: 69.5 MM HG (ref 40–50)
PDW BLD-RTO: 14.8 % (ref 12.4–15.4)
PDW BLD-RTO: 14.8 % (ref 12.4–15.4)
PDW BLD-RTO: 15 % (ref 12.4–15.4)
PDW BLD-RTO: 15 % (ref 12.4–15.4)
PDW BLD-RTO: 15.1 % (ref 12.4–15.4)
PDW BLD-RTO: 15.2 % (ref 12.4–15.4)
PERFORMED ON: ABNORMAL
PERFORMED ON: NORMAL
PERFORMED ON: NORMAL
PH ARTERIAL: 7.4 (ref 7.35–7.45)
PH VENOUS: 7.31 (ref 7.35–7.45)
PH VENOUS: 7.31 (ref 7.35–7.45)
PLATELET # BLD: 221 K/UL (ref 135–450)
PLATELET # BLD: 231 K/UL (ref 135–450)
PLATELET # BLD: 243 K/UL (ref 135–450)
PLATELET # BLD: 253 K/UL (ref 135–450)
PLATELET # BLD: 260 K/UL (ref 135–450)
PLATELET # BLD: 366 K/UL (ref 135–450)
PLATELET SLIDE REVIEW: ADEQUATE
PMV BLD AUTO: 7.6 FL (ref 5–10.5)
PMV BLD AUTO: 7.6 FL (ref 5–10.5)
PMV BLD AUTO: 7.7 FL (ref 5–10.5)
PMV BLD AUTO: 7.7 FL (ref 5–10.5)
PMV BLD AUTO: 8 FL (ref 5–10.5)
PMV BLD AUTO: 8.2 FL (ref 5–10.5)
PO2 ARTERIAL: 102.8 MM HG (ref 75–108)
PO2, VEN: 34 MM HG
PO2, VEN: 47 MM HG
POC ANION GAP: 15 (ref 10–20)
POC BUN: 15 MG/DL (ref 7–18)
POC CHLORIDE: 85 MMOL/L (ref 99–110)
POC CREATININE: 1 MG/DL (ref 0.8–1.3)
POC POTASSIUM: 3.3 MMOL/L (ref 3.5–5.1)
POC POTASSIUM: 3.4 MMOL/L (ref 3.5–5.1)
POC SAMPLE TYPE: ABNORMAL
POC SAMPLE TYPE: NORMAL
POC SODIUM: 132 MMOL/L (ref 136–145)
POC SODIUM: 133 MMOL/L (ref 136–145)
POC TROPONIN I: 0 NG/ML (ref 0–0.1)
POTASSIUM REFLEX MAGNESIUM: 3.2 MMOL/L (ref 3.5–5.1)
POTASSIUM REFLEX MAGNESIUM: 3.7 MMOL/L (ref 3.5–5.1)
POTASSIUM REFLEX MAGNESIUM: 3.8 MMOL/L (ref 3.5–5.1)
POTASSIUM REFLEX MAGNESIUM: 3.8 MMOL/L (ref 3.5–5.1)
POTASSIUM REFLEX MAGNESIUM: 4.9 MMOL/L (ref 3.5–5.1)
POTASSIUM SERPL-SCNC: 3.5 MMOL/L (ref 3.5–5.1)
POTASSIUM SERPL-SCNC: 3.6 MMOL/L (ref 3.5–5.1)
POTASSIUM SERPL-SCNC: 3.8 MMOL/L (ref 3.5–5.1)
PROCALCITONIN: 0.13 NG/ML (ref 0–0.15)
PROCALCITONIN: 0.33 NG/ML (ref 0–0.15)
PROCALCITONIN: 0.61 NG/ML (ref 0–0.15)
RAPID INFLUENZA  B AGN: NEGATIVE
RAPID INFLUENZA A AGN: NEGATIVE
RBC # BLD: 3.39 M/UL (ref 4.2–5.9)
RBC # BLD: 3.39 M/UL (ref 4.2–5.9)
RBC # BLD: 3.53 M/UL (ref 4.2–5.9)
RBC # BLD: 3.95 M/UL (ref 4.2–5.9)
RBC # BLD: 4.04 M/UL (ref 4.2–5.9)
RBC # BLD: 4.09 M/UL (ref 4.2–5.9)
REPORT: NORMAL
RESPIRATORY PANEL PCR: NORMAL
SODIUM BLD-SCNC: 131 MMOL/L (ref 136–145)
SODIUM BLD-SCNC: 133 MMOL/L (ref 136–145)
SODIUM BLD-SCNC: 134 MMOL/L (ref 136–145)
STREP PNEUMONIAE ANTIGEN, URINE: NORMAL
TCO2 ARTERIAL: 32 MMOL/L
TCO2 CALC VENOUS: 30 MMOL/L
TCO2 CALC VENOUS: 37 MMOL/L
TOTAL PROTEIN: 7.5 G/DL (ref 6.4–8.2)
TROPONIN: 0.02 NG/ML
TSH REFLEX: 2.46 UIU/ML (ref 0.27–4.2)
VITAMIN B-12: 1937 PG/ML (ref 211–911)
WBC # BLD: 10.2 K/UL (ref 4–11)
WBC # BLD: 14 K/UL (ref 4–11)
WBC # BLD: 14.5 K/UL (ref 4–11)
WBC # BLD: 15.7 K/UL (ref 4–11)
WBC # BLD: 16.9 K/UL (ref 4–11)
WBC # BLD: 8.3 K/UL (ref 4–11)

## 2018-01-01 PROCEDURE — 2700000000 HC OXYGEN THERAPY PER DAY

## 2018-01-01 PROCEDURE — 94668 MNPJ CHEST WALL SBSQ: CPT

## 2018-01-01 PROCEDURE — 99214 OFFICE O/P EST MOD 30 MIN: CPT | Performed by: NURSE PRACTITIONER

## 2018-01-01 PROCEDURE — 83735 ASSAY OF MAGNESIUM: CPT

## 2018-01-01 PROCEDURE — 1036F TOBACCO NON-USER: CPT | Performed by: INTERNAL MEDICINE

## 2018-01-01 PROCEDURE — 92526 ORAL FUNCTION THERAPY: CPT

## 2018-01-01 PROCEDURE — 94664 DEMO&/EVAL PT USE INHALER: CPT

## 2018-01-01 PROCEDURE — 4040F PNEUMOC VAC/ADMIN/RCVD: CPT | Performed by: INTERNAL MEDICINE

## 2018-01-01 PROCEDURE — 87633 RESP VIRUS 12-25 TARGETS: CPT

## 2018-01-01 PROCEDURE — 36415 COLL VENOUS BLD VENIPUNCTURE: CPT

## 2018-01-01 PROCEDURE — 99232 SBSQ HOSP IP/OBS MODERATE 35: CPT | Performed by: HOSPITALIST

## 2018-01-01 PROCEDURE — 99223 1ST HOSP IP/OBS HIGH 75: CPT | Performed by: INTERNAL MEDICINE

## 2018-01-01 PROCEDURE — 80048 BASIC METABOLIC PNL TOTAL CA: CPT

## 2018-01-01 PROCEDURE — 6370000000 HC RX 637 (ALT 250 FOR IP): Performed by: NURSE PRACTITIONER

## 2018-01-01 PROCEDURE — 2000000000 HC ICU R&B

## 2018-01-01 PROCEDURE — 6360000002 HC RX W HCPCS: Performed by: INTERNAL MEDICINE

## 2018-01-01 PROCEDURE — 94640 AIRWAY INHALATION TREATMENT: CPT

## 2018-01-01 PROCEDURE — 1200000000 HC SEMI PRIVATE

## 2018-01-01 PROCEDURE — 82803 BLOOD GASES ANY COMBINATION: CPT

## 2018-01-01 PROCEDURE — 6360000002 HC RX W HCPCS: Performed by: STUDENT IN AN ORGANIZED HEALTH CARE EDUCATION/TRAINING PROGRAM

## 2018-01-01 PROCEDURE — 94761 N-INVAS EAR/PLS OXIMETRY MLT: CPT

## 2018-01-01 PROCEDURE — 99232 SBSQ HOSP IP/OBS MODERATE 35: CPT | Performed by: INTERNAL MEDICINE

## 2018-01-01 PROCEDURE — 6360000002 HC RX W HCPCS

## 2018-01-01 PROCEDURE — 96375 TX/PRO/DX INJ NEW DRUG ADDON: CPT

## 2018-01-01 PROCEDURE — 94760 N-INVAS EAR/PLS OXIMETRY 1: CPT

## 2018-01-01 PROCEDURE — 85025 COMPLETE CBC W/AUTO DIFF WBC: CPT

## 2018-01-01 PROCEDURE — G8482 FLU IMMUNIZE ORDER/ADMIN: HCPCS | Performed by: NURSE PRACTITIONER

## 2018-01-01 PROCEDURE — 99213 OFFICE O/P EST LOW 20 MIN: CPT | Performed by: INTERNAL MEDICINE

## 2018-01-01 PROCEDURE — 82746 ASSAY OF FOLIC ACID SERUM: CPT

## 2018-01-01 PROCEDURE — 2580000003 HC RX 258: Performed by: STUDENT IN AN ORGANIZED HEALTH CARE EDUCATION/TRAINING PROGRAM

## 2018-01-01 PROCEDURE — 93010 ELECTROCARDIOGRAM REPORT: CPT | Performed by: INTERNAL MEDICINE

## 2018-01-01 PROCEDURE — 6370000000 HC RX 637 (ALT 250 FOR IP): Performed by: STUDENT IN AN ORGANIZED HEALTH CARE EDUCATION/TRAINING PROGRAM

## 2018-01-01 PROCEDURE — APPSS45 APP SPLIT SHARED TIME 31-45 MINUTES: Performed by: NURSE PRACTITIONER

## 2018-01-01 PROCEDURE — 6370000000 HC RX 637 (ALT 250 FOR IP): Performed by: EMERGENCY MEDICINE

## 2018-01-01 PROCEDURE — G8926 SPIRO NO PERF OR DOC: HCPCS | Performed by: INTERNAL MEDICINE

## 2018-01-01 PROCEDURE — G8427 DOCREV CUR MEDS BY ELIG CLIN: HCPCS | Performed by: INTERNAL MEDICINE

## 2018-01-01 PROCEDURE — 6370000000 HC RX 637 (ALT 250 FOR IP): Performed by: INTERNAL MEDICINE

## 2018-01-01 PROCEDURE — G8427 DOCREV CUR MEDS BY ELIG CLIN: HCPCS | Performed by: NURSE PRACTITIONER

## 2018-01-01 PROCEDURE — 2500000003 HC RX 250 WO HCPCS: Performed by: EMERGENCY MEDICINE

## 2018-01-01 PROCEDURE — G8987 SELF CARE CURRENT STATUS: HCPCS

## 2018-01-01 PROCEDURE — 84145 PROCALCITONIN (PCT): CPT

## 2018-01-01 PROCEDURE — 1123F ACP DISCUSS/DSCN MKR DOCD: CPT | Performed by: NURSE PRACTITIONER

## 2018-01-01 PROCEDURE — 99233 SBSQ HOSP IP/OBS HIGH 50: CPT | Performed by: NURSE PRACTITIONER

## 2018-01-01 PROCEDURE — G8996 SWALLOW CURRENT STATUS: HCPCS

## 2018-01-01 PROCEDURE — 31720 CLEARANCE OF AIRWAYS: CPT

## 2018-01-01 PROCEDURE — 2580000003 HC RX 258: Performed by: INTERNAL MEDICINE

## 2018-01-01 PROCEDURE — G8482 FLU IMMUNIZE ORDER/ADMIN: HCPCS | Performed by: INTERNAL MEDICINE

## 2018-01-01 PROCEDURE — 99238 HOSP IP/OBS DSCHRG MGMT 30/<: CPT | Performed by: HOSPITALIST

## 2018-01-01 PROCEDURE — 97165 OT EVAL LOW COMPLEX 30 MIN: CPT

## 2018-01-01 PROCEDURE — 87449 NOS EACH ORGANISM AG IA: CPT

## 2018-01-01 PROCEDURE — 99233 SBSQ HOSP IP/OBS HIGH 50: CPT | Performed by: INTERNAL MEDICINE

## 2018-01-01 PROCEDURE — 90662 IIV NO PRSV INCREASED AG IM: CPT | Performed by: INTERNAL MEDICINE

## 2018-01-01 PROCEDURE — 87798 DETECT AGENT NOS DNA AMP: CPT

## 2018-01-01 PROCEDURE — 96360 HYDRATION IV INFUSION INIT: CPT

## 2018-01-01 PROCEDURE — G0439 PPPS, SUBSEQ VISIT: HCPCS | Performed by: INTERNAL MEDICINE

## 2018-01-01 PROCEDURE — 83690 ASSAY OF LIPASE: CPT

## 2018-01-01 PROCEDURE — 99291 CRITICAL CARE FIRST HOUR: CPT

## 2018-01-01 PROCEDURE — 83036 HEMOGLOBIN GLYCOSYLATED A1C: CPT

## 2018-01-01 PROCEDURE — 93306 TTE W/DOPPLER COMPLETE: CPT

## 2018-01-01 PROCEDURE — G8420 CALC BMI NORM PARAMETERS: HCPCS | Performed by: INTERNAL MEDICINE

## 2018-01-01 PROCEDURE — 93228 REMOTE 30 DAY ECG REV/REPORT: CPT | Performed by: INTERNAL MEDICINE

## 2018-01-01 PROCEDURE — 1101F PT FALLS ASSESS-DOCD LE1/YR: CPT | Performed by: INTERNAL MEDICINE

## 2018-01-01 PROCEDURE — 71275 CT ANGIOGRAPHY CHEST: CPT

## 2018-01-01 PROCEDURE — G8978 MOBILITY CURRENT STATUS: HCPCS

## 2018-01-01 PROCEDURE — 87581 M.PNEUMON DNA AMP PROBE: CPT

## 2018-01-01 PROCEDURE — 99223 1ST HOSP IP/OBS HIGH 75: CPT | Performed by: HOSPITALIST

## 2018-01-01 PROCEDURE — 97535 SELF CARE MNGMENT TRAINING: CPT

## 2018-01-01 PROCEDURE — 96368 THER/DIAG CONCURRENT INF: CPT

## 2018-01-01 PROCEDURE — 87040 BLOOD CULTURE FOR BACTERIA: CPT

## 2018-01-01 PROCEDURE — 1101F PT FALLS ASSESS-DOCD LE1/YR: CPT | Performed by: NURSE PRACTITIONER

## 2018-01-01 PROCEDURE — 87804 INFLUENZA ASSAY W/OPTIC: CPT

## 2018-01-01 PROCEDURE — 6360000002 HC RX W HCPCS: Performed by: EMERGENCY MEDICINE

## 2018-01-01 PROCEDURE — 82947 ASSAY GLUCOSE BLOOD QUANT: CPT

## 2018-01-01 PROCEDURE — 97530 THERAPEUTIC ACTIVITIES: CPT

## 2018-01-01 PROCEDURE — 83880 ASSAY OF NATRIURETIC PEPTIDE: CPT

## 2018-01-01 PROCEDURE — 93005 ELECTROCARDIOGRAM TRACING: CPT | Performed by: HOSPITALIST

## 2018-01-01 PROCEDURE — G8419 CALC BMI OUT NRM PARAM NOF/U: HCPCS | Performed by: NURSE PRACTITIONER

## 2018-01-01 PROCEDURE — 83605 ASSAY OF LACTIC ACID: CPT

## 2018-01-01 PROCEDURE — 96366 THER/PROPH/DIAG IV INF ADDON: CPT

## 2018-01-01 PROCEDURE — 94660 CPAP INITIATION&MGMT: CPT

## 2018-01-01 PROCEDURE — 84132 ASSAY OF SERUM POTASSIUM: CPT

## 2018-01-01 PROCEDURE — 94150 VITAL CAPACITY TEST: CPT

## 2018-01-01 PROCEDURE — 2580000003 HC RX 258: Performed by: EMERGENCY MEDICINE

## 2018-01-01 PROCEDURE — 3023F SPIROM DOC REV: CPT | Performed by: INTERNAL MEDICINE

## 2018-01-01 PROCEDURE — 1123F ACP DISCUSS/DSCN MKR DOCD: CPT | Performed by: INTERNAL MEDICINE

## 2018-01-01 PROCEDURE — 1111F DSCHRG MED/CURRENT MED MERGE: CPT | Performed by: NURSE PRACTITIONER

## 2018-01-01 PROCEDURE — 84295 ASSAY OF SERUM SODIUM: CPT

## 2018-01-01 PROCEDURE — 80076 HEPATIC FUNCTION PANEL: CPT

## 2018-01-01 PROCEDURE — 94762 N-INVAS EAR/PLS OXIMTRY CONT: CPT

## 2018-01-01 PROCEDURE — 74230 X-RAY XM SWLNG FUNCJ C+: CPT

## 2018-01-01 PROCEDURE — 6360000004 HC RX CONTRAST MEDICATION: Performed by: HOSPITALIST

## 2018-01-01 PROCEDURE — 2500000003 HC RX 250 WO HCPCS: Performed by: STUDENT IN AN ORGANIZED HEALTH CARE EDUCATION/TRAINING PROGRAM

## 2018-01-01 PROCEDURE — C9113 INJ PANTOPRAZOLE SODIUM, VIA: HCPCS | Performed by: STUDENT IN AN ORGANIZED HEALTH CARE EDUCATION/TRAINING PROGRAM

## 2018-01-01 PROCEDURE — 80047 BASIC METABLC PNL IONIZED CA: CPT

## 2018-01-01 PROCEDURE — 99285 EMERGENCY DEPT VISIT HI MDM: CPT

## 2018-01-01 PROCEDURE — G0008 ADMIN INFLUENZA VIRUS VAC: HCPCS | Performed by: INTERNAL MEDICINE

## 2018-01-01 PROCEDURE — 73110 X-RAY EXAM OF WRIST: CPT

## 2018-01-01 PROCEDURE — 71045 X-RAY EXAM CHEST 1 VIEW: CPT

## 2018-01-01 PROCEDURE — 99233 SBSQ HOSP IP/OBS HIGH 50: CPT | Performed by: HOSPITALIST

## 2018-01-01 PROCEDURE — 92610 EVALUATE SWALLOWING FUNCTION: CPT

## 2018-01-01 PROCEDURE — 84443 ASSAY THYROID STIM HORMONE: CPT

## 2018-01-01 PROCEDURE — 93005 ELECTROCARDIOGRAM TRACING: CPT | Performed by: EMERGENCY MEDICINE

## 2018-01-01 PROCEDURE — 1036F TOBACCO NON-USER: CPT | Performed by: NURSE PRACTITIONER

## 2018-01-01 PROCEDURE — 82040 ASSAY OF SERUM ALBUMIN: CPT

## 2018-01-01 PROCEDURE — G8979 MOBILITY GOAL STATUS: HCPCS

## 2018-01-01 PROCEDURE — 82330 ASSAY OF CALCIUM: CPT

## 2018-01-01 PROCEDURE — G8988 SELF CARE GOAL STATUS: HCPCS

## 2018-01-01 PROCEDURE — 87486 CHLMYD PNEUM DNA AMP PROBE: CPT

## 2018-01-01 PROCEDURE — 96365 THER/PROPH/DIAG IV INF INIT: CPT

## 2018-01-01 PROCEDURE — 97162 PT EVAL MOD COMPLEX 30 MIN: CPT

## 2018-01-01 PROCEDURE — 94667 MNPJ CHEST WALL 1ST: CPT

## 2018-01-01 PROCEDURE — 4040F PNEUMOC VAC/ADMIN/RCVD: CPT | Performed by: NURSE PRACTITIONER

## 2018-01-01 PROCEDURE — G8997 SWALLOW GOAL STATUS: HCPCS

## 2018-01-01 PROCEDURE — 93000 ELECTROCARDIOGRAM COMPLETE: CPT | Performed by: NURSE PRACTITIONER

## 2018-01-01 PROCEDURE — 84484 ASSAY OF TROPONIN QUANT: CPT

## 2018-01-01 PROCEDURE — 97116 GAIT TRAINING THERAPY: CPT

## 2018-01-01 PROCEDURE — 73030 X-RAY EXAM OF SHOULDER: CPT

## 2018-01-01 PROCEDURE — 99221 1ST HOSP IP/OBS SF/LOW 40: CPT | Performed by: NURSE PRACTITIONER

## 2018-01-01 PROCEDURE — 92611 MOTION FLUOROSCOPY/SWALLOW: CPT

## 2018-01-01 PROCEDURE — 82607 VITAMIN B-12: CPT

## 2018-01-01 PROCEDURE — 73060 X-RAY EXAM OF HUMERUS: CPT

## 2018-01-01 RX ORDER — IPRATROPIUM BROMIDE AND ALBUTEROL SULFATE 2.5; .5 MG/3ML; MG/3ML
1 SOLUTION RESPIRATORY (INHALATION)
Status: DISCONTINUED | OUTPATIENT
Start: 2018-01-01 | End: 2018-01-01

## 2018-01-01 RX ORDER — SODIUM CHLORIDE 9 MG/ML
INJECTION, SOLUTION INTRAVENOUS
Status: DISPENSED
Start: 2018-01-01 | End: 2018-01-01

## 2018-01-01 RX ORDER — LOSARTAN POTASSIUM AND HYDROCHLOROTHIAZIDE 12.5; 1 MG/1; MG/1
1 TABLET ORAL DAILY
Qty: 30 TABLET | Refills: 0 | Status: SHIPPED | OUTPATIENT
Start: 2018-01-01

## 2018-01-01 RX ORDER — METHYLPREDNISOLONE SODIUM SUCCINATE 40 MG/ML
40 INJECTION, POWDER, LYOPHILIZED, FOR SOLUTION INTRAMUSCULAR; INTRAVENOUS DAILY
Status: DISCONTINUED | OUTPATIENT
Start: 2018-01-01 | End: 2018-01-01

## 2018-01-01 RX ORDER — SODIUM CHLORIDE 0.9 % (FLUSH) 0.9 %
10 SYRINGE (ML) INJECTION PRN
Status: DISCONTINUED | OUTPATIENT
Start: 2018-01-01 | End: 2018-01-01 | Stop reason: HOSPADM

## 2018-01-01 RX ORDER — LOSARTAN POTASSIUM 100 MG/1
TABLET ORAL
Qty: 90 TABLET | Refills: 0 | Status: SHIPPED | OUTPATIENT
Start: 2018-01-01 | End: 2018-01-01 | Stop reason: DRUGHIGH

## 2018-01-01 RX ORDER — ESCITALOPRAM OXALATE 10 MG/1
10 TABLET ORAL DAILY
Qty: 30 TABLET | Refills: 3 | Status: SHIPPED | OUTPATIENT
Start: 2018-01-01 | End: 2018-01-01 | Stop reason: SDUPTHER

## 2018-01-01 RX ORDER — CLOPIDOGREL BISULFATE 75 MG/1
TABLET ORAL
Qty: 90 TABLET | Refills: 1 | Status: SHIPPED | OUTPATIENT
Start: 2018-01-01 | End: 2018-01-01

## 2018-01-01 RX ORDER — METHYLPREDNISOLONE SODIUM SUCCINATE 125 MG/2ML
60 INJECTION, POWDER, LYOPHILIZED, FOR SOLUTION INTRAMUSCULAR; INTRAVENOUS ONCE
Status: COMPLETED | OUTPATIENT
Start: 2018-01-01 | End: 2018-01-01

## 2018-01-01 RX ORDER — ONDANSETRON 2 MG/ML
4 INJECTION INTRAMUSCULAR; INTRAVENOUS EVERY 6 HOURS PRN
Status: DISCONTINUED | OUTPATIENT
Start: 2018-01-01 | End: 2018-01-01 | Stop reason: HOSPADM

## 2018-01-01 RX ORDER — BUDESONIDE 0.5 MG/2ML
0.5 INHALANT ORAL 2 TIMES DAILY
Status: DISCONTINUED | OUTPATIENT
Start: 2018-01-01 | End: 2018-01-01 | Stop reason: HOSPADM

## 2018-01-01 RX ORDER — HYDROCHLOROTHIAZIDE 25 MG/1
TABLET ORAL
Qty: 90 TABLET | Refills: 1 | Status: SHIPPED | OUTPATIENT
Start: 2018-01-01 | End: 2018-01-01 | Stop reason: DRUGHIGH

## 2018-01-01 RX ORDER — POTASSIUM CHLORIDE 1.5 G/1.77G
40 POWDER, FOR SOLUTION ORAL
Status: COMPLETED | OUTPATIENT
Start: 2018-01-01 | End: 2018-01-01

## 2018-01-01 RX ORDER — MAGNESIUM SULFATE IN WATER 40 MG/ML
2 INJECTION, SOLUTION INTRAVENOUS ONCE
Status: COMPLETED | OUTPATIENT
Start: 2018-01-01 | End: 2018-01-01

## 2018-01-01 RX ORDER — LEVOTHYROXINE SODIUM 0.05 MG/1
50 TABLET ORAL DAILY
Status: DISCONTINUED | OUTPATIENT
Start: 2018-01-01 | End: 2018-01-01 | Stop reason: HOSPADM

## 2018-01-01 RX ORDER — ESCITALOPRAM OXALATE 10 MG/1
TABLET ORAL
Qty: 30 TABLET | Refills: 3 | Status: ON HOLD | OUTPATIENT
Start: 2018-01-01 | End: 2018-01-01 | Stop reason: CLARIF

## 2018-01-01 RX ORDER — PREDNISOLONE ACETATE 10 MG/ML
1 SUSPENSION/ DROPS OPHTHALMIC 4 TIMES DAILY
Status: DISCONTINUED | OUTPATIENT
Start: 2018-01-01 | End: 2018-01-01 | Stop reason: HOSPADM

## 2018-01-01 RX ORDER — INSULIN GLARGINE 100 [IU]/ML
10 INJECTION, SOLUTION SUBCUTANEOUS NIGHTLY
Status: DISCONTINUED | OUTPATIENT
Start: 2018-01-01 | End: 2018-01-01

## 2018-01-01 RX ORDER — PANTOPRAZOLE SODIUM 40 MG/10ML
40 INJECTION, POWDER, LYOPHILIZED, FOR SOLUTION INTRAVENOUS DAILY
Status: DISCONTINUED | OUTPATIENT
Start: 2018-01-01 | End: 2018-01-01 | Stop reason: ALTCHOICE

## 2018-01-01 RX ORDER — AMLODIPINE BESYLATE 5 MG/1
5 TABLET ORAL DAILY
Qty: 30 TABLET | Refills: 0 | Status: SHIPPED | OUTPATIENT
Start: 2018-01-01

## 2018-01-01 RX ORDER — ACETAMINOPHEN 325 MG/1
650 TABLET ORAL EVERY 4 HOURS PRN
COMMUNITY

## 2018-01-01 RX ORDER — LOSARTAN POTASSIUM AND HYDROCHLOROTHIAZIDE 12.5; 1 MG/1; MG/1
1 TABLET ORAL DAILY
Status: DISCONTINUED | OUTPATIENT
Start: 2018-01-01 | End: 2018-01-01 | Stop reason: HOSPADM

## 2018-01-01 RX ORDER — ONDANSETRON 2 MG/ML
INJECTION INTRAMUSCULAR; INTRAVENOUS
Status: COMPLETED
Start: 2018-01-01 | End: 2018-01-01

## 2018-01-01 RX ORDER — SIMVASTATIN 10 MG
10 TABLET ORAL EVERY EVENING
Qty: 90 TABLET | Refills: 3 | Status: SHIPPED | OUTPATIENT
Start: 2018-01-01

## 2018-01-01 RX ORDER — SODIUM CHLORIDE 0.9 % (FLUSH) 0.9 %
10 SYRINGE (ML) INJECTION EVERY 12 HOURS SCHEDULED
Status: DISCONTINUED | OUTPATIENT
Start: 2018-01-01 | End: 2018-01-01 | Stop reason: HOSPADM

## 2018-01-01 RX ORDER — IPRATROPIUM BROMIDE AND ALBUTEROL SULFATE 2.5; .5 MG/3ML; MG/3ML
1 SOLUTION RESPIRATORY (INHALATION) ONCE
Status: COMPLETED | OUTPATIENT
Start: 2018-01-01 | End: 2018-01-01

## 2018-01-01 RX ORDER — DEXTROSE MONOHYDRATE 50 MG/ML
100 INJECTION, SOLUTION INTRAVENOUS PRN
Status: DISCONTINUED | OUTPATIENT
Start: 2018-01-01 | End: 2018-01-01 | Stop reason: HOSPADM

## 2018-01-01 RX ORDER — POTASSIUM CHLORIDE 7.45 MG/ML
10 INJECTION INTRAVENOUS
Status: DISPENSED | OUTPATIENT
Start: 2018-01-01 | End: 2018-01-01

## 2018-01-01 RX ORDER — LOSARTAN POTASSIUM 50 MG/1
50 TABLET ORAL ONCE
Status: COMPLETED | OUTPATIENT
Start: 2018-01-01 | End: 2018-01-01

## 2018-01-01 RX ORDER — DEXTROSE MONOHYDRATE 25 G/50ML
12.5 INJECTION, SOLUTION INTRAVENOUS PRN
Status: DISCONTINUED | OUTPATIENT
Start: 2018-01-01 | End: 2018-01-01 | Stop reason: HOSPADM

## 2018-01-01 RX ORDER — BUPROPION HYDROCHLORIDE 150 MG/1
150 TABLET ORAL EVERY MORNING
Qty: 30 TABLET | Refills: 3 | Status: SHIPPED | OUTPATIENT
Start: 2018-01-01 | End: 2018-01-01 | Stop reason: SDUPTHER

## 2018-01-01 RX ORDER — LOSARTAN POTASSIUM AND HYDROCHLOROTHIAZIDE 12.5; 5 MG/1; MG/1
1 TABLET ORAL DAILY
Qty: 30 TABLET | Refills: 11 | Status: ON HOLD | OUTPATIENT
Start: 2018-01-01 | End: 2018-01-01 | Stop reason: HOSPADM

## 2018-01-01 RX ORDER — LEVOTHYROXINE SODIUM 0.05 MG/1
TABLET ORAL
Qty: 90 TABLET | Refills: 2 | Status: SHIPPED | OUTPATIENT
Start: 2018-01-01

## 2018-01-01 RX ORDER — SIMVASTATIN 10 MG
10 TABLET ORAL EVERY EVENING
Status: DISCONTINUED | OUTPATIENT
Start: 2018-01-01 | End: 2018-01-01 | Stop reason: HOSPADM

## 2018-01-01 RX ORDER — IPRATROPIUM BROMIDE AND ALBUTEROL SULFATE 2.5; .5 MG/3ML; MG/3ML
1 SOLUTION RESPIRATORY (INHALATION) 4 TIMES DAILY
Status: DISCONTINUED | OUTPATIENT
Start: 2018-01-01 | End: 2018-01-01 | Stop reason: HOSPADM

## 2018-01-01 RX ORDER — 0.9 % SODIUM CHLORIDE 0.9 %
500 INTRAVENOUS SOLUTION INTRAVENOUS ONCE
Status: COMPLETED | OUTPATIENT
Start: 2018-01-01 | End: 2018-01-01

## 2018-01-01 RX ORDER — CARVEDILOL 3.12 MG/1
3.12 TABLET ORAL 2 TIMES DAILY
Status: DISCONTINUED | OUTPATIENT
Start: 2018-01-01 | End: 2018-01-01 | Stop reason: HOSPADM

## 2018-01-01 RX ORDER — AMLODIPINE BESYLATE 5 MG/1
5 TABLET ORAL DAILY
Status: DISCONTINUED | OUTPATIENT
Start: 2018-01-01 | End: 2018-01-01 | Stop reason: HOSPADM

## 2018-01-01 RX ORDER — BENZONATATE 200 MG/1
200 CAPSULE ORAL 3 TIMES DAILY PRN
Qty: 30 CAPSULE | Refills: 1 | Status: SHIPPED | OUTPATIENT
Start: 2018-01-01 | End: 2018-01-01

## 2018-01-01 RX ORDER — AZITHROMYCIN 250 MG/1
500 TABLET, FILM COATED ORAL DAILY
Status: COMPLETED | OUTPATIENT
Start: 2018-01-01 | End: 2018-01-01

## 2018-01-01 RX ORDER — MIRTAZAPINE 7.5 MG/1
7.5 TABLET, FILM COATED ORAL NIGHTLY
Qty: 30 TABLET | Refills: 0 | Status: SHIPPED | OUTPATIENT
Start: 2018-01-01

## 2018-01-01 RX ORDER — MIRTAZAPINE 15 MG/1
7.5 TABLET, FILM COATED ORAL NIGHTLY
Status: DISCONTINUED | OUTPATIENT
Start: 2018-01-01 | End: 2018-01-01 | Stop reason: HOSPADM

## 2018-01-01 RX ORDER — LOSARTAN POTASSIUM AND HYDROCHLOROTHIAZIDE 12.5; 5 MG/1; MG/1
1 TABLET ORAL DAILY
Status: DISCONTINUED | OUTPATIENT
Start: 2018-01-01 | End: 2018-01-01

## 2018-01-01 RX ORDER — BISACODYL 10 MG
10 SUPPOSITORY, RECTAL RECTAL DAILY PRN
COMMUNITY

## 2018-01-01 RX ORDER — ESCITALOPRAM OXALATE 10 MG/1
10 TABLET ORAL DAILY
Status: DISCONTINUED | OUTPATIENT
Start: 2018-01-01 | End: 2018-01-01 | Stop reason: HOSPADM

## 2018-01-01 RX ORDER — ALBUTEROL SULFATE 2.5 MG/3ML
2.5 SOLUTION RESPIRATORY (INHALATION) EVERY 6 HOURS SCHEDULED
Status: DISCONTINUED | OUTPATIENT
Start: 2018-01-01 | End: 2018-01-01

## 2018-01-01 RX ORDER — NICOTINE POLACRILEX 4 MG
15 LOZENGE BUCCAL PRN
Status: DISCONTINUED | OUTPATIENT
Start: 2018-01-01 | End: 2018-01-01 | Stop reason: HOSPADM

## 2018-01-01 RX ORDER — CARVEDILOL 6.25 MG/1
TABLET ORAL
Qty: 180 TABLET | Refills: 2 | Status: SHIPPED | OUTPATIENT
Start: 2018-01-01 | End: 2018-01-01 | Stop reason: DRUGHIGH

## 2018-01-01 RX ORDER — 0.9 % SODIUM CHLORIDE 0.9 %
1000 INTRAVENOUS SOLUTION INTRAVENOUS ONCE
Status: COMPLETED | OUTPATIENT
Start: 2018-01-01 | End: 2018-01-01

## 2018-01-01 RX ORDER — AMOXICILLIN AND CLAVULANATE POTASSIUM 875; 125 MG/1; MG/1
1 TABLET, FILM COATED ORAL 2 TIMES DAILY
Qty: 8 TABLET | Refills: 0 | Status: SHIPPED | OUTPATIENT
Start: 2018-01-01 | End: 2018-01-01

## 2018-01-01 RX ORDER — BUPROPION HYDROCHLORIDE 150 MG/1
150 TABLET ORAL EVERY MORNING
Qty: 30 TABLET | Refills: 3 | Status: SHIPPED | OUTPATIENT
Start: 2018-01-01

## 2018-01-01 RX ORDER — METOPROLOL TARTRATE 5 MG/5ML
5 INJECTION INTRAVENOUS ONCE
Status: COMPLETED | OUTPATIENT
Start: 2018-01-01 | End: 2018-01-01

## 2018-01-01 RX ORDER — PANTOPRAZOLE SODIUM 40 MG/1
40 GRANULE, DELAYED RELEASE ORAL
Status: DISCONTINUED | OUTPATIENT
Start: 2018-01-01 | End: 2018-01-01 | Stop reason: HOSPADM

## 2018-01-01 RX ORDER — CARVEDILOL 3.12 MG/1
3.12 TABLET ORAL 2 TIMES DAILY
Qty: 180 TABLET | Refills: 3 | Status: SHIPPED | OUTPATIENT
Start: 2018-01-01

## 2018-01-01 RX ORDER — NITROGLYCERIN 0.4 MG/1
0.4 TABLET SUBLINGUAL EVERY 5 MIN PRN
Status: DISCONTINUED | OUTPATIENT
Start: 2018-01-01 | End: 2018-01-01 | Stop reason: HOSPADM

## 2018-01-01 RX ORDER — IPRATROPIUM BROMIDE AND ALBUTEROL SULFATE 2.5; .5 MG/3ML; MG/3ML
1 SOLUTION RESPIRATORY (INHALATION) EVERY 4 HOURS
Status: DISCONTINUED | OUTPATIENT
Start: 2018-01-01 | End: 2018-01-01

## 2018-01-01 RX ORDER — NITROGLYCERIN 20 MG/100ML
200 INJECTION INTRAVENOUS CONTINUOUS
Status: DISCONTINUED | OUTPATIENT
Start: 2018-01-01 | End: 2018-01-01

## 2018-01-01 RX ORDER — SODIUM PHOSPHATE, DIBASIC AND SODIUM PHOSPHATE, MONOBASIC 7; 19 G/133ML; G/133ML
1 ENEMA RECTAL
COMMUNITY

## 2018-01-01 RX ORDER — BUPROPION HYDROCHLORIDE 150 MG/1
150 TABLET ORAL EVERY MORNING
Status: DISCONTINUED | OUTPATIENT
Start: 2018-01-01 | End: 2018-01-01 | Stop reason: HOSPADM

## 2018-01-01 RX ORDER — ESCITALOPRAM OXALATE 10 MG/1
10 TABLET ORAL DAILY
COMMUNITY

## 2018-01-01 RX ORDER — FLUTICASONE FUROATE AND VILANTEROL 100; 25 UG/1; UG/1
2 POWDER RESPIRATORY (INHALATION) 2 TIMES DAILY
COMMUNITY

## 2018-01-01 RX ADMIN — LEVOTHYROXINE SODIUM 50 MCG: 50 TABLET ORAL at 08:44

## 2018-01-01 RX ADMIN — PANCRELIPASE 1 CAPSULE: 24000; 76000; 120000 CAPSULE, DELAYED RELEASE PELLETS ORAL at 21:51

## 2018-01-01 RX ADMIN — ESCITALOPRAM OXALATE 10 MG: 10 TABLET ORAL at 08:22

## 2018-01-01 RX ADMIN — BUPROPION HYDROCHLORIDE 150 MG: 150 TABLET, FILM COATED, EXTENDED RELEASE ORAL at 08:44

## 2018-01-01 RX ADMIN — IPRATROPIUM BROMIDE AND ALBUTEROL SULFATE 1 AMPULE: .5; 3 SOLUTION RESPIRATORY (INHALATION) at 20:43

## 2018-01-01 RX ADMIN — INSULIN LISPRO 5 UNITS: 100 INJECTION, SOLUTION INTRAVENOUS; SUBCUTANEOUS at 17:36

## 2018-01-01 RX ADMIN — SODIUM CHLORIDE 500 ML: 9 INJECTION, SOLUTION INTRAVENOUS at 18:28

## 2018-01-01 RX ADMIN — BUPROPION HYDROCHLORIDE 150 MG: 150 TABLET, FILM COATED, EXTENDED RELEASE ORAL at 09:38

## 2018-01-01 RX ADMIN — Medication 10 ML: at 22:10

## 2018-01-01 RX ADMIN — PREDNISOLONE ACETATE 1 DROP: 10 SUSPENSION/ DROPS OPHTHALMIC at 11:10

## 2018-01-01 RX ADMIN — Medication 10 ML: at 08:47

## 2018-01-01 RX ADMIN — AZITHROMYCIN MONOHYDRATE 500 MG: 500 INJECTION, POWDER, LYOPHILIZED, FOR SOLUTION INTRAVENOUS at 23:10

## 2018-01-01 RX ADMIN — INSULIN LISPRO 6 UNITS: 100 INJECTION, SOLUTION INTRAVENOUS; SUBCUTANEOUS at 03:15

## 2018-01-01 RX ADMIN — PREDNISOLONE ACETATE 1 DROP: 10 SUSPENSION/ DROPS OPHTHALMIC at 01:55

## 2018-01-01 RX ADMIN — METRONIDAZOLE 500 MG: 500 INJECTION, SOLUTION INTRAVENOUS at 20:21

## 2018-01-01 RX ADMIN — PREDNISOLONE ACETATE 1 DROP: 10 SUSPENSION/ DROPS OPHTHALMIC at 20:11

## 2018-01-01 RX ADMIN — AMPICILLIN SODIUM AND SULBACTAM SODIUM 3 G: 2; 1 INJECTION, POWDER, FOR SOLUTION INTRAMUSCULAR; INTRAVENOUS at 17:31

## 2018-01-01 RX ADMIN — INSULIN LISPRO 3 UNITS: 100 INJECTION, SOLUTION INTRAVENOUS; SUBCUTANEOUS at 14:15

## 2018-01-01 RX ADMIN — INSULIN LISPRO 8 UNITS: 100 INJECTION, SOLUTION INTRAVENOUS; SUBCUTANEOUS at 14:46

## 2018-01-01 RX ADMIN — PREDNISOLONE ACETATE 1 DROP: 10 SUSPENSION/ DROPS OPHTHALMIC at 09:22

## 2018-01-01 RX ADMIN — INSULIN LISPRO 4 UNITS: 100 INJECTION, SOLUTION INTRAVENOUS; SUBCUTANEOUS at 18:07

## 2018-01-01 RX ADMIN — PANCRELIPASE 1 CAPSULE: 24000; 76000; 120000 CAPSULE, DELAYED RELEASE PELLETS ORAL at 13:11

## 2018-01-01 RX ADMIN — PANTOPRAZOLE SODIUM 40 MG: 40 INJECTION, POWDER, FOR SOLUTION INTRAVENOUS at 09:11

## 2018-01-01 RX ADMIN — ESCITALOPRAM OXALATE 10 MG: 10 TABLET ORAL at 11:08

## 2018-01-01 RX ADMIN — AMPICILLIN SODIUM AND SULBACTAM SODIUM 3 G: 2; 1 INJECTION, POWDER, FOR SOLUTION INTRAMUSCULAR; INTRAVENOUS at 04:32

## 2018-01-01 RX ADMIN — METHYLPREDNISOLONE SODIUM SUCCINATE 60 MG: 125 INJECTION, POWDER, FOR SOLUTION INTRAMUSCULAR; INTRAVENOUS at 19:55

## 2018-01-01 RX ADMIN — INSULIN LISPRO 3 UNITS: 100 INJECTION, SOLUTION INTRAVENOUS; SUBCUTANEOUS at 09:23

## 2018-01-01 RX ADMIN — INSULIN GLARGINE 10 UNITS: 100 INJECTION, SOLUTION SUBCUTANEOUS at 20:18

## 2018-01-01 RX ADMIN — ESCITALOPRAM OXALATE 10 MG: 10 TABLET ORAL at 09:17

## 2018-01-01 RX ADMIN — PREDNISOLONE ACETATE 1 DROP: 10 SUSPENSION/ DROPS OPHTHALMIC at 17:34

## 2018-01-01 RX ADMIN — ENOXAPARIN SODIUM 40 MG: 40 INJECTION SUBCUTANEOUS at 08:43

## 2018-01-01 RX ADMIN — PANCRELIPASE 1 CAPSULE: 24000; 76000; 120000 CAPSULE, DELAYED RELEASE PELLETS ORAL at 20:10

## 2018-01-01 RX ADMIN — Medication 10 ML: at 08:24

## 2018-01-01 RX ADMIN — IPRATROPIUM BROMIDE AND ALBUTEROL SULFATE 1 AMPULE: .5; 3 SOLUTION RESPIRATORY (INHALATION) at 00:20

## 2018-01-01 RX ADMIN — LOSARTAN POTASSIUM 50 MG: 50 TABLET ORAL at 09:04

## 2018-01-01 RX ADMIN — MAGNESIUM SULFATE HEPTAHYDRATE 2 G: 40 INJECTION, SOLUTION INTRAVENOUS at 19:45

## 2018-01-01 RX ADMIN — LEVOTHYROXINE SODIUM 50 MCG: 50 TABLET ORAL at 08:21

## 2018-01-01 RX ADMIN — POTASSIUM CHLORIDE 40 MEQ: 1.5 POWDER, FOR SOLUTION ORAL at 06:37

## 2018-01-01 RX ADMIN — MIRTAZAPINE 7.5 MG: 15 TABLET, FILM COATED ORAL at 21:03

## 2018-01-01 RX ADMIN — SIMVASTATIN 10 MG: 10 TABLET, FILM COATED ORAL at 01:55

## 2018-01-01 RX ADMIN — IPRATROPIUM BROMIDE AND ALBUTEROL SULFATE 1 AMPULE: .5; 3 SOLUTION RESPIRATORY (INHALATION) at 21:25

## 2018-01-01 RX ADMIN — LOSARTAN POTASSIUM AND HYDROCHLOROTHIAZIDE 1 TABLET: 12.5; 5 TABLET ORAL at 09:18

## 2018-01-01 RX ADMIN — BUDESONIDE 500 MCG: 0.5 SUSPENSION RESPIRATORY (INHALATION) at 08:04

## 2018-01-01 RX ADMIN — SODIUM CHLORIDE 500 ML: 9 INJECTION, SOLUTION INTRAVENOUS at 19:20

## 2018-01-01 RX ADMIN — PANCRELIPASE 1 CAPSULE: 24000; 76000; 120000 CAPSULE, DELAYED RELEASE PELLETS ORAL at 17:11

## 2018-01-01 RX ADMIN — CARVEDILOL 3.12 MG: 3.12 TABLET, FILM COATED ORAL at 01:20

## 2018-01-01 RX ADMIN — PANTOPRAZOLE SODIUM 40 MG: 40 GRANULE, DELAYED RELEASE ORAL at 06:11

## 2018-01-01 RX ADMIN — INSULIN LISPRO 3 UNITS: 100 INJECTION, SOLUTION INTRAVENOUS; SUBCUTANEOUS at 08:23

## 2018-01-01 RX ADMIN — AMPICILLIN SODIUM AND SULBACTAM SODIUM 3 G: 2; 1 INJECTION, POWDER, FOR SOLUTION INTRAMUSCULAR; INTRAVENOUS at 09:45

## 2018-01-01 RX ADMIN — LOSARTAN POTASSIUM AND HYDROCHLOROTHIAZIDE 1 TABLET: 12.5; 5 TABLET ORAL at 12:33

## 2018-01-01 RX ADMIN — INSULIN LISPRO 3 UNITS: 100 INJECTION, SOLUTION INTRAVENOUS; SUBCUTANEOUS at 10:08

## 2018-01-01 RX ADMIN — BUDESONIDE 500 MCG: 0.5 SUSPENSION RESPIRATORY (INHALATION) at 20:25

## 2018-01-01 RX ADMIN — INSULIN LISPRO 3 UNITS: 100 INJECTION, SOLUTION INTRAVENOUS; SUBCUTANEOUS at 17:53

## 2018-01-01 RX ADMIN — IPRATROPIUM BROMIDE AND ALBUTEROL SULFATE 1 AMPULE: .5; 3 SOLUTION RESPIRATORY (INHALATION) at 11:35

## 2018-01-01 RX ADMIN — SIMVASTATIN 10 MG: 10 TABLET, FILM COATED ORAL at 17:33

## 2018-01-01 RX ADMIN — CARVEDILOL 3.12 MG: 3.12 TABLET, FILM COATED ORAL at 20:16

## 2018-01-01 RX ADMIN — INSULIN LISPRO 5 UNITS: 100 INJECTION, SOLUTION INTRAVENOUS; SUBCUTANEOUS at 12:45

## 2018-01-01 RX ADMIN — PREDNISOLONE ACETATE 1 DROP: 10 SUSPENSION/ DROPS OPHTHALMIC at 16:47

## 2018-01-01 RX ADMIN — IPRATROPIUM BROMIDE AND ALBUTEROL SULFATE 1 AMPULE: .5; 3 SOLUTION RESPIRATORY (INHALATION) at 20:25

## 2018-01-01 RX ADMIN — IPRATROPIUM BROMIDE AND ALBUTEROL SULFATE 1 AMPULE: .5; 3 SOLUTION RESPIRATORY (INHALATION) at 11:41

## 2018-01-01 RX ADMIN — IPRATROPIUM BROMIDE AND ALBUTEROL SULFATE 1 AMPULE: .5; 3 SOLUTION RESPIRATORY (INHALATION) at 12:28

## 2018-01-01 RX ADMIN — METRONIDAZOLE 500 MG: 500 INJECTION, SOLUTION INTRAVENOUS at 04:03

## 2018-01-01 RX ADMIN — PANCRELIPASE 1 CAPSULE: 24000; 76000; 120000 CAPSULE, DELAYED RELEASE PELLETS ORAL at 17:32

## 2018-01-01 RX ADMIN — CARVEDILOL 3.12 MG: 3.12 TABLET, FILM COATED ORAL at 08:43

## 2018-01-01 RX ADMIN — INSULIN LISPRO 6 UNITS: 100 INJECTION, SOLUTION INTRAVENOUS; SUBCUTANEOUS at 12:42

## 2018-01-01 RX ADMIN — ENOXAPARIN SODIUM 40 MG: 40 INJECTION SUBCUTANEOUS at 08:22

## 2018-01-01 RX ADMIN — INSULIN LISPRO 3 UNITS: 100 INJECTION, SOLUTION INTRAVENOUS; SUBCUTANEOUS at 20:12

## 2018-01-01 RX ADMIN — ENOXAPARIN SODIUM 40 MG: 40 INJECTION SUBCUTANEOUS at 11:08

## 2018-01-01 RX ADMIN — Medication 10 ML: at 12:25

## 2018-01-01 RX ADMIN — PREDNISOLONE ACETATE 1 DROP: 10 SUSPENSION/ DROPS OPHTHALMIC at 08:23

## 2018-01-01 RX ADMIN — INSULIN LISPRO 5 UNITS: 100 INJECTION, SOLUTION INTRAVENOUS; SUBCUTANEOUS at 14:13

## 2018-01-01 RX ADMIN — LOSARTAN POTASSIUM AND HYDROCHLOROTHIAZIDE 1 TABLET: 12.5; 1 TABLET ORAL at 08:44

## 2018-01-01 RX ADMIN — LOSARTAN POTASSIUM AND HYDROCHLOROTHIAZIDE 1 TABLET: 12.5; 5 TABLET ORAL at 11:09

## 2018-01-01 RX ADMIN — LEVOTHYROXINE SODIUM 50 MCG: 50 TABLET ORAL at 11:08

## 2018-01-01 RX ADMIN — AMPICILLIN SODIUM AND SULBACTAM SODIUM 3 G: 2; 1 INJECTION, POWDER, FOR SOLUTION INTRAMUSCULAR; INTRAVENOUS at 16:47

## 2018-01-01 RX ADMIN — IPRATROPIUM BROMIDE AND ALBUTEROL SULFATE 1 AMPULE: .5; 3 SOLUTION RESPIRATORY (INHALATION) at 15:18

## 2018-01-01 RX ADMIN — SODIUM CHLORIDE 500 ML: 9 INJECTION, SOLUTION INTRAVENOUS at 20:29

## 2018-01-01 RX ADMIN — IPRATROPIUM BROMIDE AND ALBUTEROL SULFATE 1 AMPULE: .5; 3 SOLUTION RESPIRATORY (INHALATION) at 04:00

## 2018-01-01 RX ADMIN — POTASSIUM CHLORIDE 10 MEQ: 7.46 INJECTION, SOLUTION INTRAVENOUS at 04:56

## 2018-01-01 RX ADMIN — AMLODIPINE BESYLATE 5 MG: 5 TABLET ORAL at 09:51

## 2018-01-01 RX ADMIN — AMPICILLIN SODIUM AND SULBACTAM SODIUM 3 G: 2; 1 INJECTION, POWDER, FOR SOLUTION INTRAMUSCULAR; INTRAVENOUS at 22:13

## 2018-01-01 RX ADMIN — BUPROPION HYDROCHLORIDE 150 MG: 150 TABLET, FILM COATED, EXTENDED RELEASE ORAL at 09:11

## 2018-01-01 RX ADMIN — CARVEDILOL 3.12 MG: 3.12 TABLET, FILM COATED ORAL at 11:07

## 2018-01-01 RX ADMIN — POTASSIUM CHLORIDE 40 MEQ: 1.5 POWDER, FOR SOLUTION ORAL at 09:38

## 2018-01-01 RX ADMIN — AMPICILLIN SODIUM AND SULBACTAM SODIUM 3 G: 2; 1 INJECTION, POWDER, FOR SOLUTION INTRAMUSCULAR; INTRAVENOUS at 22:44

## 2018-01-01 RX ADMIN — CARVEDILOL 3.12 MG: 3.12 TABLET, FILM COATED ORAL at 09:11

## 2018-01-01 RX ADMIN — AMPICILLIN SODIUM AND SULBACTAM SODIUM 3 G: 2; 1 INJECTION, POWDER, FOR SOLUTION INTRAMUSCULAR; INTRAVENOUS at 10:19

## 2018-01-01 RX ADMIN — INSULIN LISPRO 2 UNITS: 100 INJECTION, SOLUTION INTRAVENOUS; SUBCUTANEOUS at 21:15

## 2018-01-01 RX ADMIN — PANCRELIPASE 1 CAPSULE: 24000; 76000; 120000 CAPSULE, DELAYED RELEASE PELLETS ORAL at 16:47

## 2018-01-01 RX ADMIN — CARVEDILOL 3.12 MG: 3.12 TABLET, FILM COATED ORAL at 09:12

## 2018-01-01 RX ADMIN — Medication 10 ML: at 14:37

## 2018-01-01 RX ADMIN — PREDNISOLONE ACETATE 1 DROP: 10 SUSPENSION/ DROPS OPHTHALMIC at 13:30

## 2018-01-01 RX ADMIN — CARVEDILOL 3.12 MG: 3.12 TABLET, FILM COATED ORAL at 21:51

## 2018-01-01 RX ADMIN — ESCITALOPRAM OXALATE 10 MG: 10 TABLET ORAL at 09:11

## 2018-01-01 RX ADMIN — CARVEDILOL 3.12 MG: 3.12 TABLET, FILM COATED ORAL at 20:10

## 2018-01-01 RX ADMIN — INSULIN LISPRO 9 UNITS: 100 INJECTION, SOLUTION INTRAVENOUS; SUBCUTANEOUS at 17:34

## 2018-01-01 RX ADMIN — SODIUM CHLORIDE 500 ML: 9 INJECTION, SOLUTION INTRAVENOUS at 18:41

## 2018-01-01 RX ADMIN — METRONIDAZOLE 500 MG: 500 INJECTION, SOLUTION INTRAVENOUS at 14:12

## 2018-01-01 RX ADMIN — LOSARTAN POTASSIUM AND HYDROCHLOROTHIAZIDE 1 TABLET: 12.5; 5 TABLET ORAL at 12:42

## 2018-01-01 RX ADMIN — PANCRELIPASE 1 CAPSULE: 24000; 76000; 120000 CAPSULE, DELAYED RELEASE PELLETS ORAL at 11:10

## 2018-01-01 RX ADMIN — CEFTRIAXONE 1 G: 1 INJECTION, POWDER, FOR SOLUTION INTRAMUSCULAR; INTRAVENOUS at 20:01

## 2018-01-01 RX ADMIN — AZITHROMYCIN MONOHYDRATE 500 MG: 500 INJECTION, POWDER, LYOPHILIZED, FOR SOLUTION INTRAVENOUS at 01:04

## 2018-01-01 RX ADMIN — PANCRELIPASE 1 CAPSULE: 24000; 76000; 120000 CAPSULE, DELAYED RELEASE PELLETS ORAL at 14:12

## 2018-01-01 RX ADMIN — AMPICILLIN SODIUM AND SULBACTAM SODIUM 3 G: 2; 1 INJECTION, POWDER, FOR SOLUTION INTRAMUSCULAR; INTRAVENOUS at 03:47

## 2018-01-01 RX ADMIN — PANCRELIPASE 1 CAPSULE: 24000; 76000; 120000 CAPSULE, DELAYED RELEASE PELLETS ORAL at 17:34

## 2018-01-01 RX ADMIN — LEVOTHYROXINE SODIUM 50 MCG: 50 TABLET ORAL at 09:11

## 2018-01-01 RX ADMIN — PANTOPRAZOLE SODIUM 40 MG: 40 GRANULE, DELAYED RELEASE ORAL at 06:37

## 2018-01-01 RX ADMIN — METRONIDAZOLE 500 MG: 500 INJECTION, SOLUTION INTRAVENOUS at 21:34

## 2018-01-01 RX ADMIN — METRONIDAZOLE 500 MG: 500 INJECTION, SOLUTION INTRAVENOUS at 03:40

## 2018-01-01 RX ADMIN — IPRATROPIUM BROMIDE AND ALBUTEROL SULFATE 1 AMPULE: .5; 3 SOLUTION RESPIRATORY (INHALATION) at 08:30

## 2018-01-01 RX ADMIN — IPRATROPIUM BROMIDE AND ALBUTEROL SULFATE 1 AMPULE: .5; 3 SOLUTION RESPIRATORY (INHALATION) at 07:48

## 2018-01-01 RX ADMIN — ONDANSETRON: 2 INJECTION INTRAMUSCULAR; INTRAVENOUS at 18:59

## 2018-01-01 RX ADMIN — LOSARTAN POTASSIUM AND HYDROCHLOROTHIAZIDE 1 TABLET: 12.5; 5 TABLET ORAL at 06:22

## 2018-01-01 RX ADMIN — IPRATROPIUM BROMIDE AND ALBUTEROL SULFATE 1 AMPULE: .5; 3 SOLUTION RESPIRATORY (INHALATION) at 07:55

## 2018-01-01 RX ADMIN — LEVOTHYROXINE SODIUM 50 MCG: 50 TABLET ORAL at 09:12

## 2018-01-01 RX ADMIN — AZITHROMYCIN MONOHYDRATE 500 MG: 500 INJECTION, POWDER, LYOPHILIZED, FOR SOLUTION INTRAVENOUS at 23:35

## 2018-01-01 RX ADMIN — CEFTRIAXONE 1 G: 1 INJECTION, POWDER, FOR SOLUTION INTRAMUSCULAR; INTRAVENOUS at 20:16

## 2018-01-01 RX ADMIN — PREDNISOLONE ACETATE 1 DROP: 10 SUSPENSION/ DROPS OPHTHALMIC at 17:51

## 2018-01-01 RX ADMIN — Medication 10 ML: at 09:11

## 2018-01-01 RX ADMIN — INSULIN LISPRO 5 UNITS: 100 INJECTION, SOLUTION INTRAVENOUS; SUBCUTANEOUS at 09:13

## 2018-01-01 RX ADMIN — CARVEDILOL 3.12 MG: 3.12 TABLET, FILM COATED ORAL at 21:03

## 2018-01-01 RX ADMIN — AMPICILLIN SODIUM AND SULBACTAM SODIUM 3 G: 2; 1 INJECTION, POWDER, FOR SOLUTION INTRAMUSCULAR; INTRAVENOUS at 17:10

## 2018-01-01 RX ADMIN — SIMVASTATIN 10 MG: 10 TABLET, FILM COATED ORAL at 18:46

## 2018-01-01 RX ADMIN — Medication 10 ML: at 20:17

## 2018-01-01 RX ADMIN — INSULIN LISPRO 1 UNITS: 100 INJECTION, SOLUTION INTRAVENOUS; SUBCUTANEOUS at 16:51

## 2018-01-01 RX ADMIN — SIMVASTATIN 10 MG: 10 TABLET, FILM COATED ORAL at 18:49

## 2018-01-01 RX ADMIN — Medication 10 ML: at 20:11

## 2018-01-01 RX ADMIN — PREDNISOLONE ACETATE 1 DROP: 10 SUSPENSION/ DROPS OPHTHALMIC at 09:14

## 2018-01-01 RX ADMIN — Medication 10 ML: at 21:04

## 2018-01-01 RX ADMIN — IPRATROPIUM BROMIDE AND ALBUTEROL SULFATE 1 AMPULE: .5; 3 SOLUTION RESPIRATORY (INHALATION) at 19:43

## 2018-01-01 RX ADMIN — POTASSIUM CHLORIDE 10 MEQ: 7.46 INJECTION, SOLUTION INTRAVENOUS at 03:13

## 2018-01-01 RX ADMIN — IPRATROPIUM BROMIDE AND ALBUTEROL SULFATE 1 AMPULE: .5; 3 SOLUTION RESPIRATORY (INHALATION) at 15:58

## 2018-01-01 RX ADMIN — IPRATROPIUM BROMIDE AND ALBUTEROL SULFATE 1 AMPULE: .5; 3 SOLUTION RESPIRATORY (INHALATION) at 15:52

## 2018-01-01 RX ADMIN — PANCRELIPASE 1 CAPSULE: 24000; 76000; 120000 CAPSULE, DELAYED RELEASE PELLETS ORAL at 08:23

## 2018-01-01 RX ADMIN — INSULIN GLARGINE 10 UNITS: 100 INJECTION, SOLUTION SUBCUTANEOUS at 20:12

## 2018-01-01 RX ADMIN — SIMVASTATIN 10 MG: 10 TABLET, FILM COATED ORAL at 17:51

## 2018-01-01 RX ADMIN — PANCRELIPASE 1 CAPSULE: 24000; 76000; 120000 CAPSULE, DELAYED RELEASE PELLETS ORAL at 12:33

## 2018-01-01 RX ADMIN — SIMVASTATIN 10 MG: 10 TABLET, FILM COATED ORAL at 18:06

## 2018-01-01 RX ADMIN — INSULIN LISPRO 1 UNITS: 100 INJECTION, SOLUTION INTRAVENOUS; SUBCUTANEOUS at 14:19

## 2018-01-01 RX ADMIN — BUDESONIDE 500 MCG: 0.5 SUSPENSION RESPIRATORY (INHALATION) at 07:49

## 2018-01-01 RX ADMIN — PREDNISOLONE ACETATE 1 DROP: 10 SUSPENSION/ DROPS OPHTHALMIC at 12:42

## 2018-01-01 RX ADMIN — INSULIN LISPRO 5 UNITS: 100 INJECTION, SOLUTION INTRAVENOUS; SUBCUTANEOUS at 08:00

## 2018-01-01 RX ADMIN — POTASSIUM CHLORIDE 10 MEQ: 7.46 INJECTION, SOLUTION INTRAVENOUS at 01:09

## 2018-01-01 RX ADMIN — AMPICILLIN SODIUM AND SULBACTAM SODIUM 3 G: 2; 1 INJECTION, POWDER, FOR SOLUTION INTRAMUSCULAR; INTRAVENOUS at 10:07

## 2018-01-01 RX ADMIN — AZITHROMYCIN MONOHYDRATE 500 MG: 500 INJECTION, POWDER, LYOPHILIZED, FOR SOLUTION INTRAVENOUS at 23:28

## 2018-01-01 RX ADMIN — CARVEDILOL 3.12 MG: 3.12 TABLET, FILM COATED ORAL at 06:11

## 2018-01-01 RX ADMIN — PANCRELIPASE 1 CAPSULE: 24000; 76000; 120000 CAPSULE, DELAYED RELEASE PELLETS ORAL at 21:03

## 2018-01-01 RX ADMIN — BUDESONIDE 500 MCG: 0.5 SUSPENSION RESPIRATORY (INHALATION) at 20:43

## 2018-01-01 RX ADMIN — ENOXAPARIN SODIUM 40 MG: 40 INJECTION SUBCUTANEOUS at 09:22

## 2018-01-01 RX ADMIN — INSULIN LISPRO 2 UNITS: 100 INJECTION, SOLUTION INTRAVENOUS; SUBCUTANEOUS at 21:57

## 2018-01-01 RX ADMIN — IPRATROPIUM BROMIDE AND ALBUTEROL SULFATE 1 AMPULE: .5; 3 SOLUTION RESPIRATORY (INHALATION) at 15:41

## 2018-01-01 RX ADMIN — AZITHROMYCIN 500 MG: 250 TABLET, FILM COATED ORAL at 21:03

## 2018-01-01 RX ADMIN — NITROGLYCERIN 200 MCG/MIN: 20 INJECTION INTRAVENOUS at 18:48

## 2018-01-01 RX ADMIN — MAGNESIUM SULFATE HEPTAHYDRATE 2 G: 40 INJECTION, SOLUTION INTRAVENOUS at 06:37

## 2018-01-01 RX ADMIN — METOPROLOL TARTRATE 5 MG: 5 INJECTION, SOLUTION INTRAVENOUS at 18:49

## 2018-01-01 RX ADMIN — IPRATROPIUM BROMIDE AND ALBUTEROL SULFATE 1 AMPULE: .5; 3 SOLUTION RESPIRATORY (INHALATION) at 12:07

## 2018-01-01 RX ADMIN — BUPROPION HYDROCHLORIDE 150 MG: 150 TABLET, FILM COATED, EXTENDED RELEASE ORAL at 08:22

## 2018-01-01 RX ADMIN — SODIUM CHLORIDE 1000 ML: 9 INJECTION, SOLUTION INTRAVENOUS at 17:09

## 2018-01-01 RX ADMIN — PREDNISOLONE ACETATE 1 DROP: 10 SUSPENSION/ DROPS OPHTHALMIC at 21:51

## 2018-01-01 RX ADMIN — BUDESONIDE 500 MCG: 0.5 SUSPENSION RESPIRATORY (INHALATION) at 08:30

## 2018-01-01 RX ADMIN — IOPAMIDOL 80 ML: 755 INJECTION, SOLUTION INTRAVENOUS at 22:55

## 2018-01-01 RX ADMIN — PANCRELIPASE 1 CAPSULE: 24000; 76000; 120000 CAPSULE, DELAYED RELEASE PELLETS ORAL at 08:44

## 2018-01-01 RX ADMIN — PANCRELIPASE 1 CAPSULE: 24000; 76000; 120000 CAPSULE, DELAYED RELEASE PELLETS ORAL at 12:32

## 2018-01-01 RX ADMIN — BUDESONIDE 500 MCG: 0.5 SUSPENSION RESPIRATORY (INHALATION) at 21:25

## 2018-01-01 RX ADMIN — INSULIN GLARGINE 10 UNITS: 100 INJECTION, SOLUTION SUBCUTANEOUS at 21:57

## 2018-01-01 RX ADMIN — PREDNISOLONE ACETATE 1 DROP: 10 SUSPENSION/ DROPS OPHTHALMIC at 13:12

## 2018-01-01 RX ADMIN — IPRATROPIUM BROMIDE AND ALBUTEROL SULFATE 1 AMPULE: .5; 3 SOLUTION RESPIRATORY (INHALATION) at 08:05

## 2018-01-01 RX ADMIN — AMPICILLIN SODIUM AND SULBACTAM SODIUM 3 G: 2; 1 INJECTION, POWDER, FOR SOLUTION INTRAMUSCULAR; INTRAVENOUS at 04:02

## 2018-01-01 RX ADMIN — PREDNISOLONE ACETATE 1 DROP: 10 SUSPENSION/ DROPS OPHTHALMIC at 20:17

## 2018-01-01 RX ADMIN — NITROGLYCERIN 0.8 MG: 0.4 TABLET SUBLINGUAL at 18:40

## 2018-01-01 RX ADMIN — IPRATROPIUM BROMIDE AND ALBUTEROL SULFATE 1 AMPULE: .5; 3 SOLUTION RESPIRATORY (INHALATION) at 12:04

## 2018-01-01 RX ADMIN — AMPICILLIN SODIUM AND SULBACTAM SODIUM 3 G: 2; 1 INJECTION, POWDER, FOR SOLUTION INTRAMUSCULAR; INTRAVENOUS at 21:58

## 2018-01-01 RX ADMIN — ESCITALOPRAM OXALATE 10 MG: 10 TABLET ORAL at 08:44

## 2018-01-01 RX ADMIN — PANCRELIPASE 1 CAPSULE: 24000; 76000; 120000 CAPSULE, DELAYED RELEASE PELLETS ORAL at 09:12

## 2018-01-01 RX ADMIN — INSULIN LISPRO 3 UNITS: 100 INJECTION, SOLUTION INTRAVENOUS; SUBCUTANEOUS at 09:13

## 2018-01-01 RX ADMIN — INSULIN GLARGINE 10 UNITS: 100 INJECTION, SOLUTION SUBCUTANEOUS at 01:55

## 2018-01-01 RX ADMIN — BUDESONIDE 500 MCG: 0.5 SUSPENSION RESPIRATORY (INHALATION) at 11:42

## 2018-01-01 RX ADMIN — PANCRELIPASE 1 CAPSULE: 24000; 76000; 120000 CAPSULE, DELAYED RELEASE PELLETS ORAL at 20:17

## 2018-01-01 RX ADMIN — PANCRELIPASE 1 CAPSULE: 24000; 76000; 120000 CAPSULE, DELAYED RELEASE PELLETS ORAL at 17:51

## 2018-01-01 RX ADMIN — BUDESONIDE 500 MCG: 0.5 SUSPENSION RESPIRATORY (INHALATION) at 08:00

## 2018-01-01 RX ADMIN — ENOXAPARIN SODIUM 40 MG: 40 INJECTION SUBCUTANEOUS at 09:11

## 2018-01-01 RX ADMIN — INSULIN LISPRO 5 UNITS: 100 INJECTION, SOLUTION INTRAVENOUS; SUBCUTANEOUS at 17:52

## 2018-01-01 RX ADMIN — AMPICILLIN SODIUM AND SULBACTAM SODIUM 3 G: 2; 1 INJECTION, POWDER, FOR SOLUTION INTRAMUSCULAR; INTRAVENOUS at 12:33

## 2018-01-01 RX ADMIN — PANTOPRAZOLE SODIUM 40 MG: 40 INJECTION, POWDER, FOR SOLUTION INTRAVENOUS at 11:08

## 2018-01-01 RX ADMIN — PANCRELIPASE 1 CAPSULE: 24000; 76000; 120000 CAPSULE, DELAYED RELEASE PELLETS ORAL at 01:21

## 2018-01-01 RX ADMIN — BUPROPION HYDROCHLORIDE 150 MG: 150 TABLET, FILM COATED, EXTENDED RELEASE ORAL at 11:08

## 2018-01-01 ASSESSMENT — ENCOUNTER SYMPTOMS
NAUSEA: 0
DIARRHEA: 0
CHEST TIGHTNESS: 0
BACK PAIN: 0
SPUTUM PRODUCTION: 0
ABDOMINAL PAIN: 0
ABDOMINAL PAIN: 0
ANAL BLEEDING: 0
ABDOMINAL DISTENTION: 0
WHEEZING: 0
CONSTIPATION: 0
EYE REDNESS: 0
COUGH: 0
ABDOMINAL PAIN: 0
SHORTNESS OF BREATH: 0
SHORTNESS OF BREATH: 1
BACK PAIN: 0
EYE REDNESS: 0
BACK PAIN: 0
ABDOMINAL PAIN: 0
CHEST TIGHTNESS: 0
BACK PAIN: 0
SHORTNESS OF BREATH: 0
CHEST TIGHTNESS: 0
CHEST TIGHTNESS: 0
DIARRHEA: 0
WHEEZING: 1
VOMITING: 1
VOMITING: 1
HEARTBURN: 0
ABDOMINAL DISTENTION: 0
SHORTNESS OF BREATH: 1
PHOTOPHOBIA: 0
DIFFICULTY BREATHING: 0
BLURRED VISION: 0
ABDOMINAL PAIN: 0
TROUBLE SWALLOWING: 0
COUGH: 1
BLOOD IN STOOL: 0
TROUBLE SWALLOWING: 0
CHEST TIGHTNESS: 0
CONSTIPATION: 0
ABDOMINAL PAIN: 0
SHORTNESS OF BREATH: 0
BACK PAIN: 0
SORE THROAT: 0
NAUSEA: 0
DIARRHEA: 0
VOMITING: 1
VOMITING: 0
NAUSEA: 0
NAUSEA: 1
SHORTNESS OF BREATH: 1
DIARRHEA: 0
CONSTIPATION: 0
NAUSEA: 0
WHEEZING: 0
HEARTBURN: 0
EYE REDNESS: 0

## 2018-01-01 ASSESSMENT — PAIN SCALES - GENERAL
PAINLEVEL_OUTOF10: 0
PAINLEVEL_OUTOF10: 9
PAINLEVEL_OUTOF10: 0

## 2018-01-01 ASSESSMENT — COPD QUESTIONNAIRES: COPD: 1

## 2018-01-01 ASSESSMENT — LIFESTYLE VARIABLES: HOW OFTEN DO YOU HAVE A DRINK CONTAINING ALCOHOL: 0

## 2018-01-01 ASSESSMENT — PATIENT HEALTH QUESTIONNAIRE - PHQ9
1. LITTLE INTEREST OR PLEASURE IN DOING THINGS: 0
2. FEELING DOWN, DEPRESSED OR HOPELESS: 0
SUM OF ALL RESPONSES TO PHQ QUESTIONS 1-9: 0
SUM OF ALL RESPONSES TO PHQ QUESTIONS 1-9: 0
2. FEELING DOWN, DEPRESSED OR HOPELESS: 0
SUM OF ALL RESPONSES TO PHQ9 QUESTIONS 1 & 2: 0
SUM OF ALL RESPONSES TO PHQ9 QUESTIONS 1 & 2: 0
SUM OF ALL RESPONSES TO PHQ QUESTIONS 1-9: 0
1. LITTLE INTEREST OR PLEASURE IN DOING THINGS: 0

## 2018-01-01 ASSESSMENT — PAIN DESCRIPTION - DESCRIPTORS: DESCRIPTORS: SORE

## 2018-01-01 ASSESSMENT — PAIN DESCRIPTION - PAIN TYPE: TYPE: ACUTE PAIN

## 2018-01-01 ASSESSMENT — ANXIETY QUESTIONNAIRES: GAD7 TOTAL SCORE: 2

## 2018-01-01 ASSESSMENT — PAIN DESCRIPTION - LOCATION: LOCATION: SHOULDER

## 2018-03-27 PROBLEM — F33.41 RECURRENT MAJOR DEPRESSIVE DISORDER, IN PARTIAL REMISSION (HCC): Status: ACTIVE | Noted: 2018-01-01

## 2018-03-27 PROBLEM — J43.2 CENTRILOBULAR EMPHYSEMA (HCC): Status: ACTIVE | Noted: 2018-01-01

## 2018-03-27 NOTE — PROGRESS NOTES
every 6 hours as needed for Wheezing 1 Inhaler 3    levothyroxine (SYNTHROID) 50 MCG tablet TAKE 1 TABLET BY MOUTH DAILY 90 tablet 2    insulin glargine (TOUJEO SOLOSTAR) 300 UNIT/ML injection pen Inject 4 Units into the skin nightly      clopidogrel (PLAVIX) 75 MG tablet TAKE 1 TABLET BY MOUTH DAILY 90 tablet 3    losartan (COZAAR) 100 MG tablet TAKE 1 TABLET BY MOUTH DAILY 90 tablet 3    SPIRIVA HANDIHALER 18 MCG inhalation capsule INHALE CONTENTS OF 1 CAPSULE VIA HANDIHALER DAILY 90 capsule 3    hydrALAZINE (APRESOLINE) 50 MG tablet Take 1 tablet by mouth 3 times daily. (Patient taking differently: Take 25 mg by mouth 2 times daily ) 90 tablet 3    insulin lispro (HUMALOG) 100 UNIT/ML injection Inject 5 Units into the skin 2 times daily (with meals) Sliding scale       No current facility-administered medications on file prior to visit. Allergies   Allergen Reactions    Morphine And Related [Codeine] Nausea Only and Other (See Comments)     With morphine pt hallucinates         Review of Systems   Constitutional: Negative for appetite change, fatigue, fever and unexpected weight change. HENT: Negative for hearing loss, sore throat and trouble swallowing. Eyes: Negative for redness and visual disturbance. Respiratory: Positive for shortness of breath. Negative for chest tightness and wheezing. Cardiovascular: Positive for dyspnea on exertion. Negative for chest pain and palpitations. Gastrointestinal: Negative for abdominal pain, heartburn and nausea. Endocrine: Negative for polydipsia and polyuria. Genitourinary: Negative for dysuria and hematuria. Musculoskeletal: Negative for arthralgias, back pain and neck pain. Skin: Negative for rash and wound. Neurological: Negative for dizziness and headaches. Hematological: Negative for adenopathy. Does not bruise/bleed easily. Psychiatric/Behavioral: Negative for agitation. The patient is not nervous/anxious.          He has been

## 2018-06-20 PROBLEM — F32.9 REACTIVE DEPRESSION: Status: ACTIVE | Noted: 2018-01-01

## 2018-09-18 NOTE — PROGRESS NOTES
MD Montse   budesonide-formoterol (SYMBICORT) 160-4.5 MCG/ACT AERO Inhale 2 puffs into the lungs 2 times daily  KEZIA Jiménez CNP   albuterol sulfate HFA (PROAIR HFA) 108 (90 Base) MCG/ACT inhaler Inhale 2 puffs into the lungs every 6 hours as needed for Wheezing  KEZIA Jiménez CNP   insulin glargine (TOUJEO SOLOSTAR) 300 UNIT/ML injection pen Inject 4 Units into the skin nightly  Virgie Worley   SPIRIVA HANDIHALER 18 MCG inhalation capsule INHALE CONTENTS OF 1 CAPSULE VIA HANDIHALER DAILY  Harinder Willard MD   insulin lispro (HUMALOG) 100 UNIT/ML injection Inject 5 Units into the skin 2 times daily (with meals) Sliding scale  Historical Provider, MD     Past Medical History:   Diagnosis Date    [facility] [facility]     Diabetes mellitus ([facility] [facility] 2007    Hyperlipidemia     Hypertension     Necrotizing pancreatitis 2007    Pancreatitis 2007     Past Surgical History:   Procedure Laterality Date    ABDOMEN SURGERY      PANCREAS SURGERY  2007    Dr. Zohra Rowell  2007     No family history on file. CareTeam (Including outside providers/suppliers regularly involved in providing care):   Patient Care Team:  Daquan Yusuf MD as PCP - General  Daquan Yusuf MD as PCP - S Attributed Provider  Phillip Napoles as Consulting Physician (Endocrinology)  Mich Kessler DPM as Consulting Physician (Podiatry)      Review of Systems   Constitutional: Positive for weight loss. Negative for fever. HENT: Positive for hearing loss (Has hearing aids. ). Eyes: Negative for blurred vision. Respiratory: Negative for sputum production, shortness of breath and wheezing. Cardiovascular: Negative for chest pain and leg swelling. Gastrointestinal: Negative for abdominal pain, constipation, diarrhea and heartburn. Genitourinary: Negative for dysuria and urgency.    Musculoskeletal: Negative for back coordination and speech normal    Patient's complete Health Risk Assessment and screening values have been reviewed and are found in Flowsheets. The following problems were reviewed today and where indicated follow up appointments were made and/or referrals ordered. Positive Risk Factor Screenings with Interventions:     Cognitive:  Clock Drawing Test (CDT) Score: (!) Abnormal (no numbers on the clock. wrote 10 45 with 2 hands)  Total Score Interpretation: Positive Mini-Cog  Cognitive Impairment Interventions:  · Labs ordered- see orders    General Health:  General  In general, how would you say your health is?: Fair  In the past 7 days, have you experienced any of the following?: (!) New or Increased Fatigue, Stress  Do you get the social and emotional support that you need?: Yes  Do you have a Living Will?: Yes  General Health Risk Interventions:  · He has had some dizziness. I will try reducing his bp meds since his weight is down so low. Health Habits/Nutrition:  Health Habits/Nutrition  Do you exercise for at least 20 minutes 2-3 times per week?: Yes  Have you lost any weight without trying in the past 3 months?: (!) Yes  Do you eat fewer than 2 meals per day?: No  Have you seen a dentist within the past year?: Yes  Body mass index is 18.85 kg/m². Health Habits/Nutrition Interventions:  · Nutritional issues:  He is referred back to GI for the pancreatic insufficiency.      Hearing/Vision:  Hearing/Vision  Do you or your family notice any trouble with your hearing?: (!) Yes (hearing aids)  Do you have difficulty driving, watching TV, or doing any of your daily activities because of your eyesight?: No  Have you had an eye exam within the past year?: Yes  Hearing/Vision Interventions:  · None indicated    ADL:  ADLs  In the past 7 days, did you need help from others to perform any of the following everyday activities?: (!) Walking/Balance  In the past 7 days, did you need help from others to take care of any of the following?: None (wife takes care of all of this because she is used to doing it but patient would be capable of doing it)  ADL Interventions:  · Blood pressure meds reduced. refer to GI for increased nutrition. Personalized Preventive Plan   Current Health Maintenance Status  Immunization History   Administered Date(s) Administered    Influenza Whole 10/26/2015    Influenza, High Dose (Fluzone 65 yrs and older) 09/15/2016, 09/19/2017    Pneumococcal 13-valent Conjugate ([id number]) 03/18/2015    Pneumococcal Polysaccharide ([id number]) 10/01/2005    Zoster Live (Zostavax) 07/01/2012        Health Maintenance   Topic Date Due    Flu vaccine (1) 09/01/2018    DTaP/Tdap/Td vaccine (1 - Tdap) 09/18/2019 (Originally 7/10/1954)    Shingles Vaccine (1 of 2 - 2 Dose Series) 09/18/2019 (Originally 9/1/2012)    Potassium monitoring  06/18/2019    Creatinine monitoring  06/18/2019    Pneumococcal low/med risk  Completed     Recommendations for Preventive Services Due: see orders and patient instructions/AVS.  . Recommended screening schedule for the next 5-10 years is provided to the patient in written form: see Patient Instructions/AVS.    1. Well adult exam  Stable. See orders. 2. Type 2 diabetes mellitus with hyperglycemia, with long-term current use of insulin (HCC)  Stable. Continue diabetes control as per endocrinologist.    3. Essential hypertension  Stable. Medications reduced to avoid hypotension. 4. Centrilobular emphysema (HCC)  Stable. Beta blocker reduced. 5. Mild cognitive impairment  Stable. Beta  blocker reduced. 6. Hypothyroidism due to acquired atrophy of thyroid  Stable. Check labs. - TSH with Reflex; Future    7. Vitamin B 12 deficiency  Stable on replacement pill therapy. Check level. - Vitamin B12 & Folate; Future    8. Vitamin D deficiency  Stable. Check vitamin D.    9. Hypokalemia  Stable. Check potassium.  - Basic Metabolic Panel; Future    10.  Need for influenza vaccination  Stable. Flu shot.   - INFLUENZA, HIGH DOSE, 65 YRS +, IM, PF, PREFILL SYR, 0.5ML (FLUZONE HD)

## 2018-09-18 NOTE — PATIENT INSTRUCTIONS
Personalized Preventive Plan for Yash Arreola - 9/18/2018  Medicare offers a range of preventive health benefits. Some of the tests and screenings are paid in full while other may be subject to a deductible, co-insurance, and/or copay. Some of these benefits include a comprehensive review of your medical history including lifestyle, illnesses that may run in your family, and various assessments and screenings as appropriate. After reviewing your medical record and screening and assessments performed today your provider may have ordered immunizations, labs, imaging, and/or referrals for you. A list of these orders (if applicable) as well as your Preventive Care list are included within your After Visit Summary for your review. Other Preventive Recommendations:    · A preventive eye exam performed by an eye specialist is recommended every 1-2 years to screen for glaucoma; cataracts, macular degeneration, and other eye disorders. · A preventive dental visit is recommended every 6 months. · Try to get at least 150 minutes of exercise per week or 10,000 steps per day on a pedometer . · Order or download the FREE \"Exercise & Physical Activity: Your Everyday Guide\" from [employer]. Call 4-859.136.6724 or search [employer] online. · You need 4784-8942 mg of calcium and 0101-0592 IU of vitamin D per day. It is possible to meet your calcium requirement with diet alone, but a vitamin D supplement is usually necessary to meet this goal.  · When exposed to the sun, use a sunscreen that protects against both UVA and UVB radiation with an SPF of 30 or greater. Reapply every 2 to 3 hours or after sweating, drying off with a towel, or swimming. · Always wear a seat belt when traveling in a car. Always wear a helmet when riding a bicycle or motorcycle.

## 2018-09-20 NOTE — TELEPHONE ENCOUNTER
Pt's wife called in wanting to let Dr. Simone More know that he has not been taking his Pravastatin due to him not liking the taste of it. Wants to know what Dr. Simone More recommends.  Please cb to advise

## 2018-10-03 NOTE — ED PROVIDER NOTES
1 tablet by mouth daily (1/2 pill daily for the first 6 days)    INSULIN GLARGINE (TOUJEO SOLOSTAR) 300 UNIT/ML INJECTION PEN    Inject 4 Units into the skin nightly    INSULIN LISPRO (HUMALOG) 100 UNIT/ML INJECTION    Inject 5 Units into the skin 2 times daily (with meals) Sliding scale    LEVOTHYROXINE (SYNTHROID) 50 MCG TABLET    TAKE 1 TABLET BY MOUTH DAILY    LIPASE-PROTEASE-AMYLASE (CREON) 12951-60629 UNITS DELAYED RELEASE CAPSULE    Take 1 capsule by mouth 4 times daily     LOSARTAN-HYDROCHLOROTHIAZIDE (HYZAAR) 50-12.5 MG PER TABLET    Take 1 tablet by mouth daily    PANTOPRAZOLE SODIUM (PROTONIX) 40 MG PACK PACKET    Take 40 mg by mouth every morning (before breakfast)    PREDNISOLONE ACETATE (PRED FORTE) 1 % OPHTHALMIC SUSPENSION    1 drop 4 times daily    SIMVASTATIN (ZOCOR) 10 MG TABLET    Take 1 tablet by mouth every evening    SPIRIVA HANDIHALER 18 MCG INHALATION CAPSULE    INHALE CONTENTS OF 1 CAPSULE VIA HANDIHALER DAILY    VITAMIN D 1000 UNITS CAPS    Take 2,000 Units by mouth       Allergies     He is allergic to morphine and related [codeine]. Physical Exam     INITIAL VITALS: BP: (!) 181/94, Temp: 98 °F (36.7 °C), Pulse: 88, Resp: 18, SpO2: 92 %   Physical Exam   Constitutional: He is oriented to person, place, and time. He appears well-developed and well-nourished. No distress. HENT:   Head: Normocephalic and atraumatic. Mouth/Throat: Oropharynx is clear and moist. No oropharyngeal exudate. Eyes: Pupils are equal, round, and reactive to light. Conjunctivae and EOM are normal. Right eye exhibits no discharge. Left eye exhibits no discharge. Neck: Normal range of motion. Neck supple. No tracheal deviation present. Cardiovascular: Normal rate, regular rhythm and normal heart sounds. Pulmonary/Chest: Effort normal and breath sounds normal. No stridor. No respiratory distress. Abdominal: Soft. He exhibits no distension. There is no tenderness.    Previous surgical scars noted

## 2018-10-03 NOTE — TELEPHONE ENCOUNTER
Spoke with Dr. Alem Desai regarding spouse concerns. It would be an option to take the patient to the hospital to be evaluated for injuries from the falls. Home care is also an option as the spouse is aging and unable to keep up with the care of patient as much. Dr. Alem Desai will wait for report from the hospital before going forward with home care referral.    Spoke with spouse and advised of MD recommendations. She will take patient to the hospital to be evaluated.

## 2018-10-16 NOTE — TELEPHONE ENCOUNTER
[address] [place] [address]- [phone]. Pt spouse called [facility] on Sunday stating pt was unable to keep food down, pt did nothing but vomiting, no eating or drinking  Spouse has trouble getting pt out of the house, spouse does not want to take pt to the ED due to [facility] refuses to take pt to the ED and its to much to set up a  or home health aids, spouse does not want [facility] to come back to the house, since RN can not treat pt at home  Pt BS dropped to 50 w/ the vomiting. Jyoti states pls call her if clarification is needed.

## 2018-10-16 NOTE — TELEPHONE ENCOUNTER
Spoke with Jyoti. Stated the spouse called the on call on Sunday and was instructed to go to the ER or to schedule with Dr. Castillo Livingston for medical attention. Patient cannot keep anything down and hasn't been eating or drinking. Patient/spouse are not following the insulin regimens. Sugars have dropped to the 50s. Spouse refuses to take patient to the ER. Got mad at [facility] and called her a liar and the therapists a liar. [facility] stated she offered a  which was also refused by the spouse. Stated spouse is unwilling to allow help. Patient also is not showering. [facility] states she does not feel the patient is safe in the home with just the spouse. Wanted to inform Dr. Castillo Livingston.

## 2018-10-17 PROBLEM — E44.0 MODERATE PROTEIN-CALORIE MALNUTRITION (HCC): Status: ACTIVE | Noted: 2018-01-01

## 2018-10-31 NOTE — TELEPHONE ENCOUNTER
Patient's wife says Jenelle Jarret is there with the patient and they want him to see Dr. Jace Ulrich so he can be evaluated whether patient is well enough to go see Dr. Melissa Moreno. Patient has appt scheduled 11/5 3:40pm (40 min)    Also patients wife would like a call back regarding the message Mychal Covarrubias [facility]) left 10/29.   m

## 2018-11-04 PROBLEM — I16.1 HYPERTENSIVE EMERGENCY: Status: ACTIVE | Noted: 2018-01-01

## 2018-11-05 PROBLEM — E43 SEVERE MALNUTRITION (HCC): Chronic | Status: ACTIVE | Noted: 2018-01-01

## 2018-11-05 NOTE — PROGRESS NOTES
Nutrition Assessment    Type and Reason for Visit: Initial, Positive Nutrition Screen    Nutrition Recommendations:   · Monitor tolerance and encourage PO intake on Dysphagia 2 diet w/ nectar thick liquids   · Monitor acceptance of ONS: Ensure Enlive and Magic Cup  · Recommend discussion of appetite stimulant if medically appropriate     Meets ASPEN criteria for severe malnutrition. Aggressive nutrition intervention should be considered, up to and including alternative means of nutrition/hydration, if nutrition status can not improve. Malnutrition Assessment:  · Malnutrition Status: Meets the criteria for severe malnutrition  · Context: Chronic illness  · Findings of the 6 clinical characteristics of malnutrition (Minimum of 2 out of 6 clinical characteristics is required to make the diagnosis of moderate or severe Protein Calorie Malnutrition based on AND/ASPEN Guidelines):  1. Energy Intake-Less than or equal to 50%, greater than or equal to 3 months    2. Weight Loss-10% loss or greater, in 1 month  3. Fat Loss-Severe subcutaneous fat loss, Orbital, Triceps  4. Muscle Loss-Severe muscle mass loss, Temples (temporalis muscle), Clavicles (pectoralis and deltoids)  5. Fluid Accumulation-No significant fluid accumulation,    6.  Strength-Not measured    Nutrition Diagnosis:   · Problem: Severe malnutrition  · Etiology: related to Insufficient energy/nutrient consumption     Signs and symptoms:  as evidenced by Weight loss, Intake 25-50%, Severe loss of subcutaneous fat, Severe muscle loss    Nutrition Assessment:  · Subjective Assessment: pt severely malnourished as evidence by 25% of meals consumed reported per wife for the past 3-4 months, 15.5% wt loss x1 month PTA (-130lb to CBW 103lb) and severe muscle and fat loss.  Pt is at risk for further compromise d/t lack of appetite reported by wife, ill fitting dentures 2/2 wt loss and difficultly chewing foods, SLP recommending dysphagia 2 diet w/

## 2018-11-05 NOTE — ED PROVIDER NOTES
[address] [place]          ATTENDING PHYSICIAN NOTE     Date of evaluation: 11/4/2018    Chief Complaint     No chief complaint on file. History of Present Illness     Ynag Chen is a 81 y.o. male with a history of DM2, HTN, HL, COPD, and recurrent pancreatitis who presents with respiratory distress. Unclear onset. EMS says not feeling well for several weeks. Family says not eating well, losing weight. Unaware of respiratory distress. On arrival in significant distress. Unable to obtain additional history 2/2 inability to talk. EMS attempted CPAP but did not tolerate. Review of Systems     Review of Systems   Unable to perform ROS: Severe respiratory distress       Past Medical, Surgical, Family, and Social History     He has a past medical history of COPD (chronic obstructive pulmonary disease) ([facility] [address] [facility] Diabetes mellitus ([facility] [address] [facility] Hyperlipidemia; Hypertension; Kidney stone; Necrotizing pancreatitis; and Pancreatitis. He has a past surgical history that includes Pancreas surgery (2007); Rem of Trach Tube & Closure of Trch-Cut; Abdomen surgery; and tracheostomy (2007). His family history is not on file. He reports that he quit smoking about 38 years ago. His smoking use included Cigarettes. He has a 60.00 pack-year smoking history. He has never used smokeless tobacco. He reports that he does not drink alcohol or use drugs.     Medications     Previous Medications    ALBUTEROL SULFATE HFA (PROAIR HFA) 108 (90 BASE) MCG/ACT INHALER    Inhale 2 puffs into the lungs every 6 hours as needed for Wheezing    BUDESONIDE-FORMOTEROL (SYMBICORT) 160-4.5 MCG/ACT AERO    Inhale 2 puffs into the lungs 2 times daily    BUPROPION (WELLBUTRIN XL) 150 MG EXTENDED RELEASE TABLET    TAKE 1 TABLET BY MOUTH EVERY MORNING    CARVEDILOL (COREG) 3.125 MG TABLET    Take 1 tablet by mouth 2 times daily    CYANOCOBALAMIN 1000 MCG TABLET    Take 1,000 mcg by mouth daily    ESCITALOPRAM sating in low 80s. Brief GOC discussion with wife and daughter. Prior prolonged intubation>trach. Will think about intubation as incredibly frail at baseline.  [AZ]   1918 Suspect 2/2 hypoxemia rather than infection but will cover with CTX/metro for possible aspiration PNA. Full 30mL/kg not ordered 2/2 likely pulmonary edema. Lactate, ser/plas: (!) 3.02 [AZ]   1923 Leukocytosis with neutrophilia WBC: (!) 14.0 [AZ]   1923 Mildly elevated BNP: (!) 171.0 [AZ]   1923 BSUS with L Blines, no R Blines, ? PNA  [AZ]   1924 ST at 108bpm, inferior/lateral ST depressions, no T changes EKG 12 Lead [AZ]   1924 O2 sat now in low 90s. Appears slightly more comfortable.  [AZ]   1935 Mildly elevated, suspect demand, holding ASA given respiratory status Troponin: (!) 0.02 [AZ]   1939 Will replete Magnesium: (!) 1.30 [AZ]   1939 Elevated without e/o DKA Glucose: (!) 333 [AZ]   1952 Unchanged peripheral parenchymal and pleural density in the left apex and superolateral upper lobe. No acute cardiopulmonary findings. XR CHEST PORTABLE [AZ]   1954 LFTs/lipase unremarkable  [AZ]   2012 Improving pH, Christ: (!) 7.312 [AZ]   2029 Now just on HFNC. Appears slightly more comfortable.  [AZ]   2035 Spoke to Dr. Mike Kerr (PCP). Agrees with ICU admission. Working diagnosis acute hypercapnic respiratory failure 2/2 flash pulmonary edema + COPD. ? respiratory infection. Procal pending.  [facility] Course User Index  [AZ] Ani Herndon MD       Clinical Impression     1. Acute hypercapnic respiratory failure (HCC)        Disposition     PATIENT REFERRED TO:  No follow-up provider specified.     DISCHARGEMEDICATIONS:  New Prescriptions    No medications on file       DISPOSITION Decision To Admit 11/04/2018 08:16:32 PM     Ani Herndon MD  11/04/18 2037

## 2018-11-05 NOTE — H&P
100 UNIT/ML injection Inject 5 Units into the skin 2 times daily (with meals) Sliding scale    Historical Provider, MD       Allergies:  Morphine and related [codeine]    Social History:      The patient currently lives at home with wife. TOBACCO:   reports that he quit smoking about 38 years ago. His smoking use included Cigarettes. He has a 60.00 pack-year smoking history. He has never used smokeless tobacco.  ETOH:  reports that he does not drink alcohol. Family History:     No family history on file. REVIEW OF SYSTEMS: Pertinent positives as noted in the HPI. All other systems reviewed and negative. ROS: Review of Systems   Constitutional: Negative for chills and fever. Respiratory: Positive for shortness of breath. Negative for chest tightness. Cardiovascular: Negative for chest pain and leg swelling. Gastrointestinal: Positive for vomiting. Negative for abdominal pain and nausea. Neurological: Negative for dizziness, seizures, syncope and headaches. PHYSICAL EXAM PERFORMED:    /73   Pulse 113   Temp 98 °F (36.7 °C) (Axillary)   Resp 23   Ht 5' 8\" (1.727 m)   Wt 121 lb 3.2 oz (55 kg)   SpO2 94%   BMI 18.43 kg/m²     Physical Exam   Constitutional: He is oriented to person, place, and time. He appears distressed. Cachectic   HENT:   Head: Normocephalic and atraumatic. Eyes: Conjunctivae and EOM are normal.   Neck: Normal range of motion. Neck supple. Cardiovascular: Normal heart sounds. Exam reveals no gallop and no friction rub. No murmur heard. Tachycardic  Irregular rate   Pulmonary/Chest: He is in respiratory distress. He has no wheezes. He has rales. He exhibits no tenderness. Course breath sounds bilaterally   Abdominal: Soft. He exhibits no distension. There is no tenderness. Musculoskeletal: He exhibits no edema or deformity. Neurological: He is alert and oriented to person, place, and time. No cranial nerve deficit. Skin: Skin is warm and dry.

## 2018-11-05 NOTE — PROGRESS NOTES
of lung disease, is not using inhaled anti-inflammatory medication at home, and lacks wheezing by examination or by history for at least 24 hours, contact physician for possible discontinuation.

## 2018-11-05 NOTE — PROGRESS NOTES
objective and subjective findings as well as the ICU course of treatment have been reviewed with the ICU team. The treatment plan has been reviewed with the ICU team. The patient is being transferred to [place] [address] in stable condition.      Shayna Acevedo MD  Internal Medicine, PGY 1  Pager: [phone]

## 2018-11-05 NOTE — PROGRESS NOTES
percent but less than 20 percent impaired, limited or restricted      Therapy Time  SLP Individual Minutes  Time In: 0825  Time Out: 0840  Minutes: 15     Plan:  Recommended diet:  TBD after MBS  MBS today  Pt therapy goal: to figure out what is going on  Pt dc goal: to get better    Enio Epps M.S./[facility]-SLP #[id number]  Pg.  # [phone]    Needs met prior to leaving room, call light within reach, d/w RN [facility]  This document will serve as a dc summary if pt dc prior to next visit    11/5/2018 8:45 AM

## 2018-11-05 NOTE — PROCEDURES
INSTRUMENTAL SWALLOW REPORT  MODIFIED BARIUM SWALLOW/treatment note    NAME: Shaq Zepeda   : 1935  MRN: [id number]       Date of Eval: 2018     Ordering Physician: Dr. Kirill Khalil  Radiologist: Dr. Amie Romberg     Referring Diagnosis(es): Referring Diagnosis: hypertensive emergency    Past Medical History:  has a past medical history of COPD (chronic obstructive pulmonary disease) ([facility] [address] [facility] Diabetes mellitus ([facility] [address] [facility] Hyperlipidemia; Hypertension; Kidney stone; Necrotizing pancreatitis; and Pancreatitis. Past Surgical History:  has a past surgical history that includes Pancreas surgery (); Rem of Trach Tube & Closure of Trch-Cut; Abdomen surgery; and tracheostomy (). Current Diet Solid Consistency: Regular  Current Diet Liquid Consistency: Thin  Recent Chest Xray 18  Unchanged peripheral parenchymal and pleural density in the left apex and superolateral upper lobe.       No acute cardiopulmonary findings.          CT of Chest 18      No CT evidence of pulmonary embolism.       Underlying emphysema most prominent in the upper lobes.       Parenchymal and pleural scarring in the upper lobes, extensive in the central and peripheral left upper lobe.       Patchy ill-defined airspace disease in both lower lobes, right greater than left, and in the posterior right middle lobe. Findings are consistent with an acute inflammatory/infectious process/pneumonia. Correlate clinically.          Type of Study: Repeat MBS  Results of Prior Study: no penetration or aspiration identified    Patient Complaints/Reason for Referral:  Shaq Zepeda was referred for a MBS to assess the efficiency of his/her swallow function, assess for aspiration, and to make recommendations regarding safe dietary consistencies, effective compensatory strategies, and safe eating environment.   Patient complaints: pt reports sometimes coughing when eating and drinking    Onset of problem:   Date of Onset:

## 2018-11-05 NOTE — CONSULTS
98.3  CONSTITUTIONAL:  awake, alert, cooperative, no apparent distress, and appears stated age  NECK:  Supple, symmetrical, trachea midline, no adenopathy, thyroid symmetric, not enlarged and no tenderness, skin normal  LUNGS:  no increased work of breathing and clear to auscultation. No accessory muscle use  CARDIOVASCULAR:  normal S1 and S2, no edema and no JVD  ABDOMEN:  normal bowel sounds, non-distended and no masses palpated, and no tenderness to palpation. No hepatospleenomegaly  LYMPHADENOPATHY:  no axillary or supraclavicular adenopathy. No cervical adnenopathy  PSYCHIATRIC: Oriented to person place and time. No obvious depression or anxiety. MUSCULOSKELETAL: No obvious misalignment or effusion of the joints. No clubbing, cyanosis of the digits. SKIN:  normal skin color, texture, turgor and no redness, warmth, or swelling.  No palpable nodules    DATA:    Old records have been reviewed  CBC with Differential:  Lab Results   Component Value Date    WBC 15.7 11/05/2018    RBC 3.95 11/05/2018    HGB 11.3 11/05/2018    HCT 35.1 11/05/2018     11/05/2018    MCV 88.7 11/05/2018    MCH 28.6 11/05/2018    MCHC 32.2 11/05/2018    RDW 14.8 11/05/2018    NRBC CANCELED 11/08/2017    NRBC CANCELED 11/08/2017    SEGSPCT 71.7 11/08/2017    BANDSPCT 5.0 04/25/2013    BLASTSPCT CANCELED 11/08/2017    METASPCT CANCELED 11/08/2017    LYMPHOPCT 3.5 11/05/2018    PROMYELOPCT CANCELED 11/08/2017    MONOPCT 3.4 11/05/2018    MYELOPCT CANCELED 11/08/2017    BASOPCT 0.2 11/05/2018    MONOSABS 0.5 11/05/2018    LYMPHSABS 0.6 11/05/2018    EOSABS 0.0 11/05/2018    BASOSABS 0.0 11/05/2018     BMP:  Lab Results   Component Value Date     11/05/2018    K 4.9 11/05/2018    CL 92 11/05/2018    CO2 25 11/05/2018    BUN 15 11/05/2018    CREATININE 0.9 11/05/2018    CALCIUM 8.2 11/05/2018    GFRAA >60 11/05/2018    GFRAA 109 04/30/2013    LABGLOM >60 11/05/2018    LABGLOM 58 11/08/2017    GLUCOSE 210 11/05/2018     Hepatic Function Panel:  Lab Results   Component Value Date    ALKPHOS 82 2018    ALT 7 2018    AST 18 2018    PROT 7.5 2018    BILITOT 0.8 2018    BILIDIR <0.2 2018    IBILI see below 2018     ABG:  Lab Results   Component Value Date    VCB5DFM 30.9 2018    BEART 6 2018    K8PZGPHH 98 2018    PHART 7.402 2018    FZN0MGY 49.6 2018    PO2ART 102.8 2018    TYT4QUJ 32 2018     Initial VB.31/55   Procalcitonin 0.33    Cultures:   Blood Culture: In lab  Sputum Culture:    Respiratory Panel PCR 2018 11:55 PM [facility]   Respiratory Pathogens Panel PCR Result: Not Detected   See additional report for complete Respiratory Pathogens Panel      Urine Legionella Ag: In lab  Strep Pneumo Ag: In lab    Radiology Review:  All pertinent images / reports were reviewed as a part of this visit. CT Chest reveals the following:  Impression       No CT evidence of pulmonary embolism.       Underlying emphysema most prominent in the upper lobes.       Parenchymal and pleural scarring in the upper lobes, extensive in the central and peripheral left upper lobe.       Patchy ill-defined airspace disease in both lower lobes, right greater than left, and in the posterior right middle lobe. Findings are consistent with an acute inflammatory/infectious process/pneumonia. Correlate clinically. MBS  Impression       Abnormal modified barium swallow with tracheal aspiration of thin barium as above.       Please refer to the Speech Pathologist report for dietary recommendations. PFTs:  None on file    Echo:  Summary   Left ventricular cavity size is normal. Normal left ventricular wall   thickness. Overall left ventricular systolic function appears low normal   with an ejection fraction of 50%. No regional wall motion abnormalities are   noted. Normal diastolic function.   The aortic valve appears thickened/calcified but opens well.  The

## 2018-11-05 NOTE — ED TRIAGE NOTES
Pt arrived to ED with respiratory distress via EMS. Pt currently on NRB at 70%. Alert and oriented and talking to staff. Family at bedside talking to doctor.

## 2018-11-06 NOTE — PROGRESS NOTES
Transferred patient to 26. Report called to Tay wall RN. Placed on a med/surg bed prior to transfer, new linens and clary provided. Belongings with patient. Per patient request, he did not want he wife called at this time d/t it being late.     Venu Louis RN

## 2018-11-06 NOTE — PROGRESS NOTES
Gross per 24 hour   Intake              875 ml   Output                0 ml   Net              875 ml     CURRENT PULSE OXIMETRY:  SpO2: 97 %  24HR PULSE OXIMETRY RANGE:  SpO2  Av.8 %  Min: 87 %  Max: 100 %    CONSTITUTIONAL:  awake, alert, cooperative, no apparent distress, and appears stated age  NECK:  Supple, symmetrical, trachea midline  LUNGS:  Expiratory Transmitted upper airway rhonchorous rattling, Clear lung fields otherwise with bilateral lower field egophony. no increased work of breathing. No accessory muscle use  CARDIOVASCULAR:  normal S1 and S2, no edema and no JVD  ABDOMEN: non-distended and no masses palpated, and no tenderness to palpation. LYMPHADENOPATHY:  no supraclavicular adenopathy. No cervical adnenopathy  PSYCHIATRIC: Oriented to person place and time. No obvious depression or anxiety. MUSCULOSKELETAL: No obvious misalignment or effusion of the joints. No clubbing, cyanosis of the digits. SKIN:  normal skin color, texture, turgor and no redness, warmth, or swelling.  No palpable nodules    DATA:    Old records have been reviewed  CBC with Differential:  Lab Results   Component Value Date    WBC 16.9 2018    RBC 4.04 2018    HGB 11.4 2018    HCT 34.9 2018     2018    MCV 86.2 2018    MCH 28.1 2018    MCHC 32.6 2018    RDW 15.0 2018    NRBC CANCELED 2017    NRBC CANCELED 2017    SEGSPCT 71.7 2017    BANDSPCT 5.0 2013    BLASTSPCT CANCELED 2017    METASPCT CANCELED 2017    LYMPHOPCT 5.3 2018    PROMYELOPCT CANCELED 2017    MONOPCT 6.0 2018    MYELOPCT CANCELED 2017    BASOPCT 0.2 2018    MONOSABS 1.0 2018    LYMPHSABS 0.9 2018    EOSABS 0.0 2018    BASOSABS 0.0 2018     BMP:  Lab Results   Component Value Date     2018    K 3.7 2018    CL 92 2018    CO2 26 2018    BUN 20 2018    CREATININE 0.9

## 2018-11-06 NOTE — PROGRESS NOTES
Patient admitted to 26. Patient placed on tele and vitals taken. Patient on 1L O2. Patient alert and oriented. No other needs stated.

## 2018-11-07 NOTE — PROGRESS NOTES
b. HR < 140 bpm  c. RR < 30 bpm                d. Can demonstrate a 2-3 second inspiratory hold  4. Bronchodilators will be administered via Hand Held Nebulizer BRANDON [facility]) to patients when ANY of the following criteria are met  a. Incognizant or uncooperative          b. Patients treated with HHN at Home        c. Unable to demonstrate proper use of MDI with spacer     d. RR > 30 bpm   5. Bronchodilators will be delivered via Metered Dose Inhaler (MDI), HHN, Aerogen to intubated patients on mechanical ventilation. 6. Inhalation medication orders will be delivered and/or substituted as outlined below. Aerosolized Medications Ordering and Administration Guidelines:    1. All Medications will be ordered by a physician, and their frequency and/or modality will be adjusted as defined by the patients Respiratory Severity Index (RSI) score. 2. If the patient does not have documented COPD, consider discontinuing anticholinergics when RSI is less than 9.  3. If the bronchospasm worsens (increased RSI), then the bronchodilator frequency can be increased to a maximum of every 4 hours. If greater than every 4 hours is required, the physician will be contacted. 4. If the bronchospasm improves, the frequency of the bronchodilator can be decreased, based on the patient's RSI, but not less than home treatment regimen frequency. 5. Bronchodilator(s) will be discontinued if patient has a RSI less than 9 and has received no scheduled or as needed treatment for 72  Hrs. Patients Ordered on a Mucolytic Agent:    1. Must always be administered with a bronchodilator. 2. Discontinue if patient experiences worsened bronchospasm, or secretions have lessened to the point that the patient is able to clear them with a cough. Anti-inflammatory and Combination Medications:    1.  If the patient lacks prior history of lung disease, is not using inhaled anti-inflammatory medication at home, and lacks wheezing by examination or by

## 2018-11-07 NOTE — PROGRESS NOTES
meal time insulin    Severe protein malnutrition  - consulted dietician appreciate recs  - dysphagia 2 diet     HTN  Initially required nitro gtt, now off.   - continue home coreg and hyzaar      Chronic pancreatitis  - continue creon capsule      Depression/anxiety  - continue bupropion and lexapro      Code Status: Full Code  F/E/N: Dietary Nutrition Supplements: Standard High Calorie Oral Supplement  Dietary Nutrition Supplements: Frozen Oral Supplement  DIET DYSPHAGIA II MECHANICALLY ALTERED; Carb Control: 4 carb choices (60 gms)/meal; Nectar Thick    GI / DVT Prophylaxis: lovenox  Disposition:  [facility]      Tereza Aparicio MD  Internal Medicine, PGY 1  Pager: [phone]      I will discuss the patient with the senior resident and attending, Karuna Andino MD      Addendum to Resident H& P/Progress note:  I have personally seen,examined and evaluated the patient.  I have reviewed the current history, physical findings, labs and assessment and plan and agree with note as documented by resident MD ( Cari Lowe)  + paroxysmal a fib, currently in sinus rhythm  + hypokalemia, will replace  Insulin regimen was adjusted    Accucheck Glucoses:   Recent Labs      11/06/18   2046  11/07/18   0509  11/07/18   0528  11/07/18   0737  11/07/18   1118   POCGLU  310*  58*  74  [facility]     Karuna Andino MD, [address]

## 2018-11-07 NOTE — PROGRESS NOTES
present.   Trivial tricuspid regurgitation.   Estimated pulmonary artery systolic pressure is at 23 mmHg assuming a right   atrial pressure of 3 mmHg.   No prior echo for comparison. Problem List:   Patient Active Problem List    Diagnosis Date Noted    Acute hypercapnic respiratory failure ([facility]     Sepsis ([address]     Diabetes mellitus type 2 in nonobese ([address]     COPD, severity to be determined ([address]     Hypertensive emergency 11/04/2018    Severe malnutrition ([address] 10/17/2018    Reactive depression 06/20/2018    Recurrent major depressive disorder, in partial remission ([address] 03/27/2018    Centrilobular emphysema ([address] 03/27/2018    Vitamin B 12 deficiency 12/14/2016    Vitamin D deficiency 09/14/2016    Mixed hyperlipidemia 09/14/2016    Weight loss, abnormal 09/14/2016    Excessive cerumen in ear canal 05/17/2016    Hearing loss 05/17/2016    Mild cognitive impairment 03/11/2016    Hypothyroidism due to acquired atrophy of thyroid 12/20/2015    Type 2 diabetes mellitus with hyperglycemia ([address] 11/09/2015    Essential hypertension 11/09/2015    Skin lesion 11/09/2015    Cholelithiasis 11/01/2013    Pneumonia of both lower lobes due to infectious organism ([facility] [address] 04/25/2013        Assessment:   1. Acute hypercapnic respiratory failure (HCC)    2. Paroxysmal AF    Cardiac Hx: Angle Luis is a 81 y.o. man with a h/o HTN, HLD, DM, COPD, necrotizing pancreatitis who p/w resp distress, acute hypoxic resp failure, developed AF/AFL around 1650 yesterday which converted to NSR last pm at 2100. Patient may have felt some palpitations. [id number] 3. TSH 2.46 (10/3/18). AF  - ? Lone episode  - Echo showed a LA size of 2.9, volume of 34  - Outpatient monitor to assess for burden  - If recurrent - would consider oral anticoagulation  - Dr. Moni Cm to see today.   - ECG ordered and results personally reviewed       EF of 68%  No systolic HF  No known CAD  No Anticoagulation for AF at this time  No tobacco use. All questions and concerns were addressed to the patient/family. Alternatives to my treatment were discussed. The note was completed using EMR. Every effort was made to ensure accuracy; however, inadvertent computerized transcription errors may be present.      Arlyn [address] [facility]

## 2018-11-07 NOTE — PROGRESS NOTES
inflammatory/infectious process/pneumonia. Correlate clinically.           Prior Dysphagia History: pt was seen 11/11/13 for MBS were regular diet/thin liquids was recommended. Pt has history of trach and feeding tube, 9 years ago per spouse report    Chart reviewed. Medical Diagnosis:hypertensive emergency  Treatment Diagnosis: dysphagia       MBS results 11/5/18  Pt presenting with mod oropharyngeal dysphagia. Pt with prolonged mastication due to edentulism. Pt has dentures but has not been able to wear due to weight loss. Thin liquids via cup and straw were aspirated during the swallow secondary to decreased epiglottal and laryngeal closure. No spontaneous cough occurred. Pt was able to cough on command however did not expel any of the penetrated / aspirated material. A chin tuck was successful at eliminating the majority of penetration when using a cup. Chin tuck via straw resulted in cont aspiration. Puree and nectar thick liquids were consumed with no penetration or aspiration. There is no significant pharyngeal residue. Recommend dysphagia II/nectar thick liquids - no straw - train for use of chin tuck    Pain: no    Current Diet : Dysphagia II/nectar thick liquids - train use of chin tuck    Treatment:  Pt seen bedside to address the following goals:  1- The patient will tolerate instrumental swallowing procedure  11/5: goal met  2-The patient will tolerate recommended diet without observed clinical signs of aspiration  11/6:  Pt coughing prior to PO trials,spouse states he coughs all of the time. Pt analyzed with nectar thick liquids via cup with use of chin tuck, with coughing after as well. Pt then given medications per RN in applesauce, required nectar liquid to clear fully. Pt did not consume any solid textures at this time. Spouse very concerned regarding the coughing, stating he hasn't had relief from it in weeks. RN placed call to MD for spouse to discuss.   Cont goal  11/7: pt sitting up in bed eating breakfast.  Pt consuming yogurt, applesauce and nectar thick liquids. No coughing noted today, subjectively sounds better than when coughing continuously yesterday. Pt did state that the eggs were too dry for him to swallow. Pt also took medication followed by sips of nectar liquids with no difficulty. Encouraged pt to just eat what he felt like he could easily swallow. Pt was using chin tuck with liquids ~ 60% of time with no cues. Cont goal    3-The patient/caregiver will demonstrate understanding of dysphagia recommendations/compensatory strategies for improved swallowing safety. 11/5:  Pt educated to anatomy/physiology of swallow, concern for aspiration of thin liquids, rationale for diet recommendations/use of chin tuck with liquids, no straw and solid texture. Pt stated partial comprehension, will require cont education/reinforcement  Cont  Goal  11/6: extensive education provided to pt/spouse regarding results of MBS, concern for aspiration of thin liquids, recommendation for nectar liquids at this time, demonstrated how to thicken liquids and obtain additional thickener upon dc. Educated to the importance of sitting fully upright with all  PO. Handout provided regading no thin liquid diet as well. Spouse stating she feels totally overwhelmed right now, as she is suppose to have spinal surgery herself. Feel pt would benefit from palliative care consult, explained their role to spouse - she was very receptive and would like to speak with palliative care team.    Cont goal  11/7: pt able to state he should use chin tuck and demonstrated use as above, ~ 60% of time with no cues. Required instruction to keep head fully down until swallow completed. Educated pt again to rationale for this and importance of sitting upright with all PO.  Pt stated understanding  Cont goal    4- pt will participate in swallowing exercises with mod cues  11/6: not targeted this date as pt coughing

## 2018-11-07 NOTE — PLAN OF CARE
Problem: Discharge Planning:  Goal: Discharged to appropriate level of care  Discharged to appropriate level of care   / to assist with discharge needs. Patient is currently receiving IV antibiotics for disease process. PT/OT is to see patient to assist with determining discharge needs. Goal: Participates in care planning  Participates in care planning   Patient is cooperative with current plan of care.

## 2018-11-08 NOTE — DISCHARGE INSTR - COC
treatment of the diagnosis listed and that he requires [place] for less 30 days.      Update Admission H&P: No change in H&P    PHYSICIAN SIGNATURE:  Electronically signed by Davi Duran MD/ Sandip Tony MD on 11/9/18 at 1:52 PM

## 2018-11-08 NOTE — PROGRESS NOTES
Electrophysiology - PROGRESS NOTE    Admit Date: 11/4/2018     Chief Complaint: pAF, HTN     Interval History:   Patient seen and examined and notes reviewed. Patient is being followed for pAF in the setting of PNA. BP elevated overnight. No CP or SOB. No AF overnight.      In: 340 [P.O.:340]  Out: 515    Wt Readings from Last 2 Encounters:   11/06/18 134 lb 7.7 oz (61 kg)   10/16/18 122 lb (55.3 kg)         Data:   Scheduled Meds:   Scheduled Meds:   insulin lispro  0-6 Units Subcutaneous TID WC    insulin lispro  0-3 Units Subcutaneous Nightly    ampicillin-sulbactam  3 g Intravenous Q6H    pantoprazole sodium  40 mg Oral QAM AC    ipratropium-albuterol  1 ampule Inhalation 4x daily    budesonide  0.5 mg Nebulization BID    buPROPion  150 mg Oral QAM    carvedilol  3.125 mg Oral BID    escitalopram  10 mg Oral Daily    levothyroxine  50 mcg Oral Daily    lipase-protease-amylase  1 capsule Oral 4x daily    losartan-hydrochlorothiazide  1 tablet Oral Daily    prednisoLONE acetate  1 drop Both Eyes 4x Daily    simvastatin  10 mg Oral QPM    sodium chloride flush  10 mL Intravenous 2 times per day    enoxaparin  40 mg Subcutaneous Daily    azithromycin  500 mg Intravenous Q24H    insulin glargine  10 Units Subcutaneous Nightly     Continuous Infusions:   dextrose       PRN Meds:.nitroGLYCERIN, sodium chloride flush, magnesium hydroxide, ondansetron, glucose, dextrose, glucagon (rDNA), dextrose  Continuous Infusions:   dextrose         Intake/Output Summary (Last 24 hours) at 11/08/18 0823  Last data filed at 11/08/18 0818   Gross per 24 hour   Intake              580 ml   Output              765 ml   Net             -185 ml       CBC:   Lab Results   Component Value Date    WBC 10.2 11/08/2018    HGB 10.0 11/08/2018     11/08/2018     BMP:  Lab Results   Component Value Date     11/08/2018    K 3.8 11/08/2018    CL 97 11/08/2018 CO2 28 11/08/2018    BUN 14 11/08/2018    CREATININE 0.8 11/08/2018    GLUCOSE 234 11/08/2018     INR:   Lab Results   Component Value Date    INR 1.18 01/24/2014        CARDIAC LABS  ENZYMES:No results for input(s): CKMB, CKMBINDEX, TROPONINI in the last 72 hours. Invalid input(s): CKTOTAL;3  FASTING LIPID PANEL:  Lab Results   Component Value Date    HDL 26 03/16/2016    LDLCALC 49 03/16/2016    TRIG 284 03/16/2016    TSH 2.81 06/15/2017     LIVER PROFILE:  Lab Results   Component Value Date    AST 18 11/04/2018    AST 16 06/18/2018    ALT 7 11/04/2018    ALT 7 06/18/2018       -----------------------------------------------------------------  Telemetry: Personally reviewed  NSR, HR ~ 60    Objective:   Vitals: BP (!) 177/99   Pulse 67   Temp 97.5 °F (36.4 °C) (Oral)   Resp 16   Ht 5' 8\" (1.727 m)   Wt 134 lb 7.7 oz (61 kg)   SpO2 94%   BMI 20.45 kg/m²   General appearance: alert, appears stated age and cooperative, No acute distress   Skin: Skin color, texture, turgor normal. No rashes or ecchymosis.   Neck: no JVD, supple, symmetrical, trachea midline   Lungs: , no accessory muscle use, no respiratory distress  Heart: reg rate and rhythm  Abdomen: soft, non-tender; bowel sounds normal  Extremities: No edema, DP +  Psychiatric: normal insight and affect    Patient Active Problem List:     Pneumonia of both lower lobes due to infectious organism [facility])     Cholelithiasis     Type 2 diabetes mellitus with hyperglycemia ([facility] [address]     Essential hypertension     Skin lesion     Hypothyroidism due to acquired atrophy of thyroid     Mild cognitive impairment     Excessive cerumen in ear canal     Hearing loss     Vitamin D deficiency     Mixed hyperlipidemia     Weight loss, abnormal     Vitamin B 12 deficiency     Recurrent major depressive disorder, in partial remission (HCC)     Centrilobular emphysema (HCC)     Reactive depression     Severe malnutrition (HCC)     Hypertensive emergency     Acute hypercapnic

## 2018-11-08 NOTE — PROGRESS NOTES
Nutrition Assessment    Type and Reason for Visit: Reassess      Nutrition Recommendations:   · Monitor tolerance and encourage PO intake on Dysphagia 2 diet w/ nectar thick liquids, no drinking straw per SLP  · Monitor acceptance of ONS: Ensure Enlive TID and Magic Cup BID  · Recommend discussion of appetite stimulant if medically appropriate   · Consume small/frequent meals and snacks to build appetite  · Monitor daily weight, labs and clinical progress     Meets ASPEN criteria for severe malnutrition. Aggressive nutrition intervention should be considered, up to and including alternative means of nutrition/hydration, if nutrition status can not improve.       Nutrition Assessment: Follow-up for diet tolerance, po intake and ONS acceptance. Pt eating breakfast during interview. Pt states he is feeling decent today. Pt denies n/v/d/c. Pt reports he is eating better (appetite is picking up). Pt states he drinks Boost at home. Pt reports it's been awhile since he has consumed nutrition supplements. Pt hasn't consumed ONS per I/O. SLP diet recs: dysphagia II/nectar thick liquids, no straw. Weight fluctuations anticipated on combo diuretic therapy. +2.3 L since adm noted. Pt is malnourished as evidenced by weight loss, poor po intake, muscle wasting and loss of subcutaneous fat. Pt is at risk for nutritional compromise d/t advanced age and hx of necrotizing pancreatitis, DM, high lipids, HTN, COPD. Continue to monitor nutrition status. Malnutrition Assessment:  · Malnutrition Status: Meets the criteria for severe malnutrition  · Context: Chronic illness  · Findings of the 6 clinical characteristics of malnutrition (Minimum of 2 out of 6 clinical characteristics is required to make the diagnosis of moderate or severe Protein Calorie Malnutrition based on AND/ASPEN Guidelines):  1. Energy Intake-Less than or equal to 50%, greater than or equal to 3 months    2. Weight Loss-10% loss or greater, in 1 month  3.  Fat

## 2018-11-08 NOTE — PROGRESS NOTES
BUN  20  18   --   14   CREATININE  0.9  0.8   --   0.8   CALCIUM  8.4  8.4   --   8.1*     No results for input(s): AST, ALT, BILIDIR, BILITOT, ALKPHOS in the last 72 hours. No results for input(s): INR in the last 72 hours. No results for input(s): Savannah Lieu in the last 72 hours. Urinalysis:      Lab Results   Component Value Date    NITRU Negative 06/18/2018    WBCUA 3-5 06/18/2018    BACTERIA 1+ 06/18/2018    RBCUA 3-5 06/18/2018    BLOODU MODERATE 06/18/2018    SPECGRAV >=1.030 06/18/2018    GLUCOSEU Negative 06/18/2018       Radiology:  FL MODIFIED BARIUM SWALLOW W VIDEO   Final Result      Abnormal modified barium swallow with tracheal aspiration of thin barium as above. Please refer to the Speech Pathologist report for dietary recommendations. CTA CHEST W CONTRAST   Final Result      No CT evidence of pulmonary embolism. Underlying emphysema most prominent in the upper lobes. Parenchymal and pleural scarring in the upper lobes, extensive in the central and peripheral left upper lobe. Patchy ill-defined airspace disease in both lower lobes, right greater than left, and in the posterior right middle lobe. Findings are consistent with an acute inflammatory/infectious process/pneumonia. Correlate clinically. XR CHEST PORTABLE   Final Result      Unchanged peripheral parenchymal and pleural density in the left apex and superolateral upper lobe. No acute cardiopulmonary findings. Assessment/Plan:    Oscar Schneider is a 81 y.o. male w/ PMH HTN, COPD, DM II presenting with dyspnea and acute hypoxic respiratory failure, requiring vapotherm in the ED. Acute Hypoxic Respiratory Failure  Patient presented with severe coughing and SOB with SpO2 in 70s on room air. CT chest with patchy airspace disease in both lower lobes consistent with PNA. Currently satting well on RA. Infectious work up negative. Pro-calcitonin 0.03.  Echo with EF 50%, mild-mod wife, Gene Hebert, and RN. Accucheck Glucoses:   Recent Labs      11/07/18   1118  11/07/18   1620  11/07/18   2043  11/08/18   0738  11/08/18   1129   POCGLU  136*  183*  294*  255*  189*     PT/ OT eval was noted.     Sandip Tony MD, FACP

## 2018-11-08 NOTE — PROGRESS NOTES
SBP in 190s this AM. No PRN meds available. Notified resident on call - OK to give her AM dose of coreg and hyzaar early, which was administered. Will recheck BP later. Pt appears to be choking up on nectar thick fluids - meds given with apple sauce, which seemed to be working better.

## 2018-11-08 NOTE — PROGRESS NOTES
in the posterior right middle lobe. Findings are consistent with an acute inflammatory/infectious process/pneumonia. Correlate clinically.           Prior Dysphagia History: pt was seen 11/11/13 for MBS were regular diet/thin liquids was recommended. Pt has history of trach and feeding tube, 9 years ago per spouse report    Chart reviewed. Medical Diagnosis:hypertensive emergency  Treatment Diagnosis: dysphagia       MBS results 11/5/18  Pt presenting with mod oropharyngeal dysphagia. Pt with prolonged mastication due to edentulism. Pt has dentures but has not been able to wear due to weight loss. Thin liquids via cup and straw were aspirated during the swallow secondary to decreased epiglottal and laryngeal closure. No spontaneous cough occurred. Pt was able to cough on command however did not expel any of the penetrated / aspirated material. A chin tuck was successful at eliminating the majority of penetration when using a cup. Chin tuck via straw resulted in cont aspiration. Puree and nectar thick liquids were consumed with no penetration or aspiration. There is no significant pharyngeal residue. Recommend dysphagia II/nectar thick liquids - no straw - train for use of chin tuck    Pain: no    Current Diet : Dysphagia II/nectar thick liquids     Treatment:  Pt seen bedside to address the following goals:  1- The patient will tolerate instrumental swallowing procedure  11/5: goal met    2-The patient will tolerate recommended diet without observed clinical signs of aspiration  11/6:  Pt coughing prior to PO trials,spouse states he coughs all of the time. Pt analyzed with nectar thick liquids via cup with use of chin tuck, with coughing after as well. Pt then given medications per RN in applesauce, required nectar liquid to clear fully. Pt did not consume any solid textures at this time. Spouse very concerned regarding the coughing, stating he hasn't had relief from it in weeks.  RN placed call to MD

## 2018-11-08 NOTE — PROGRESS NOTES
Clinical Pharmacy Progress Note    ADMIT DATE: 11/4/2018     INTERVAL UPDATE: afebrile overnight, on RA. No acute events overnight. 80 yo M with PMHx significant for pancreatitis, kidney stones, HTN, DM, COPD. Current medical problems include acute respiratory failure, sepsis 2/2 PNA. Remains on IV Unasyn + azithromycin. PERTINENT MEDICATIONS:   (11/4-11/6)  (11/4-11/6)  Unasyn 3g IV q6h -- day #3  Azithromycin 500 mg IV q24h -- day #5 of 5    LABORATORY:  Estimated Creatinine Clearance: 61 mL/min (based on SCr of 0.8 mg/dL). Recent Labs      11/07/18   0447  11/08/18   0443   WBC  14.5*  10.2   HGB  9.8*  10.0*   PLT  253  231     MICROBIOLOGY:  Rapid influenza (11/4) -- Negative  Blood (11/4) -- NGTD  Legionella antigen, urine (11/4) -- Negative  Strep Pneumoniae antigen, urine (11/4) -- Negative    VITALS:  BP (!) 143/92   Pulse 82   Temp 97 °F (36.1 °C) (Axillary)   Resp 16   Ht 5' 8\" (1.727 m)   Wt 134 lb 7.7 oz (61 kg)   SpO2 93%   BMI 20.45 kg/m²     Intake/Output Summary (Last 24 hours) at 11/08/18 1142  Last data filed at 11/08/18 1045   Gross per 24 hour   Intake              460 ml   Output              965 ml   Net             -505 ml       DIET:  Dysphagia II    ASSESSMENT/PLAN:  1)  IV To Po Conversion:   · Will convert azithromycin IV to 500 mg PO daily x 1 more day (order was written for 5 days total, pt has received 4 doses so far) based on IV to PO policy (see below). · Criteria for changing to oral: clinically stable, tolerating oral medications, WBC within normal limits. Please call with questions --   Zuleyka Mancera PharmD, [facility]  Wireless # [phone]  11/8/2018 11:42 AM    Patient Selection   A. Able to tolerate oral / NG medications, diet, or tube feeding    B. Exhibits signs of clinical improvement specific to the drug therapy to be switched.    C. Criteria required for IV antibiotics prior to conversion to an oral equivalent   Temp is trending down and is <

## 2018-11-08 NOTE — PROGRESS NOTES
does express that he hasn't been eating much at home and his wife has had to do more around the house lately because he hasn't been feeling well. \"I don't want my wife have to do things I normally do. \"  Pain Assessment  Patient Currently in Pain: Denies    Social/Functional History  Social/Functional History  Lives With: Spouse  Type of Home: House  Home Access: Stairs to enter without rails  Entrance Stairs - Number of Steps: 1 SHANELLE (states it is too large of a step; looking to have steep step made into 2 smaller steps)  Bathroom Shower/Tub: Tub/Shower unit  [employer]: Standard (grab bar on one side; vanity on other side)  Bathroom Equipment: Grab bars in shower, Shower chair, Grab bars around toilet, Hand-held shower  Bathroom Accessibility: Accessible  Home Equipment: Rolling walker (bed rail)  Receives Help From:  (Home PT and Home OT)  ADL Assistance: Independent (sometimes wife helps w/ socks)  Homemaking Assistance: Needs assistance (shares w/ wife)  Ambulation Assistance: Independent (using wh walker)  Transfer Assistance: Independent  Active : No  Patient's  Info: wife drives pt to appointments; pt's wife gets groceries  Additional Comments: has had 3 or 4 falls in past 3 months       Objective   Vision: Within Functional Limits  Hearing: Exceptions to [facility] (poor hearing despite hearing aides - states the batteries are dead and that his wife will be bring new batteries today)  Hearing Exceptions: Hard of hearing/hearing concerns;Bilateral hearing aid    Orientation  Overall Orientation Status: Within Functional Limits     Balance  Sitting Balance: Stand by assistance  Standing Balance: Contact guard assistance  Standing Balance/Tolerance for Standing Activity  Time: ~ 2 minutes  Activity: bed to chair; short distance mobility to/from room using wh walker  Comment: tolerated fair - c/o fatigue  Functional Mobility  Functional - Mobility Device: Rolling Walker  Activity: To/from bathroom  Assist Level: Minimal assistance (CG/Min A)  Functional Mobility Comments: Note: pt ambulated to/from doorway of room - c/o fatigue; cues to keep body centered within walker especially when turning  Toilet Transfers  Toilet - Technique: Ambulating (using wh walker)  Equipment Used: Standard bedside commode (simulated with bedside chair)  Toilet Transfer: Contact guard assistance  ADL  Feeding: Independent  Grooming: Independent (seated; decreased standing tolerance)  LE Dressing: Minimal assistance; Increased time to complete  Toileting: Contact guard assistance (steadying assist)  Tone RUE  RUE Tone: Normotonic  Tone LUE  LUE Tone: Normotonic     Bed mobility  Supine to Sit: Contact guard assistance (HOB elevated, effortful, bedrail)  Scooting: Contact guard assistance (to EOB)  Transfers  Sit to stand: Contact guard assistance  Stand to sit: Contact guard assistance  Transfer Comments: Note: tendency to pullup on walker, demo learning w/ cues     Cognition  Overall Cognitive Status: WFL                 LUE AROM (degrees)  LUE General AROM: shoulder flex ~30, elbow to hand WFL  Left Hand AROM (degrees)  Left Hand AROM: WFL  RUE AROM (degrees)  RUE General AROM: shoulder flex ~30, elbow to hand WFL  Right Hand AROM (degrees)  Right Hand AROM: WFL              Pt seen by OT for eval and treat. Treatment included:  Bed mobility, functional transfer, ADL, pt education           Assessment   Performance deficits / Impairments: Decreased functional mobility ; Decreased ADL status; Decreased balance;Decreased endurance;Decreased strength  Assessment: Pt admitted from home w/ c/o resp distress and found to have pneumonia. Pt admits to weakness and decreased appetite at home. Pt will benefit from continued OT intervention to maximize functional status. Pt educated about appropriate levels of increased activity during admission - stated understanding, may need encouragement. No DME needs.   Continue per

## 2018-11-08 NOTE — PROGRESS NOTES
understanding  Barriers to Learning: none  REQUIRES PT FOLLOW UP: Yes  Activity Tolerance  Activity Tolerance: Patient limited by fatigue;Patient limited by endurance  Activity Tolerance: After amb: SpO2: 89-90% on RA. HR 78. /92     G-Code  PT G-Codes  Functional Assessment Tool Used: [facility]  Score: 17  Functional Limitation: Mobility: Walking and moving around  Mobility: Walking and Moving Around Current Status (): At least 40 percent but less than 60 percent impaired, limited or restricted  Mobility: Walking and Moving Around Goal Status (): At least 20 percent but less than 40 percent impaired, limited or restricted  OutComes Score    AM-PAC Score  AM-PAC Inpatient Mobility Raw Score : 17  AM-PAC Inpatient T-Scale Score : 42.13  Mobility Inpatient CMS 0-100% Score: 50.57  Mobility Inpatient CMS G-Code Modifier : CK          Goals  Short term goals  Time Frame for Short term goals: d/c  Short term goal 1: supine<>sit SBA  Short term goal 2: sit<>stand SBA  Short term goal 3: amb 48' with RW and SBA  Patient Goals   Patient goals : return home, be stronger to take burden off wife doing all chores    Plan    Plan  Times per week: 2-5  Current Treatment Recommendations: Strengthening, Balance Training, Functional Mobility Training, Transfer Training, Gait Training, Endurance Training, Safety Education & Training  Safety Devices  Type of devices: Chair alarm in place, Call light within reach, Left in chair, Gait belt, Nurse notified     Therapy Time   Individual Concurrent Group Co-treatment   Time In 1045         Time Out 1124         Minutes 39           Timed Code Treatment Minutes:  24    Total Treatment Minutes:  39    If the patient is discharged before the next treatment session, this note will serve as the discharge summary.      Alice Alexandre, PT, DPT [id number]

## 2018-11-09 NOTE — PROGRESS NOTES
coughing significantly and extensive education provided during this session  Cont goal  11/7: pt performed effortful swallow with mod cues, able to perform only repetitions. Encouraged to perform throughout the day attempting to increase amount of reps. Pt stated understanding  Cont goal  11/8: not addressed formally as pt eating breakfast  Cont goal  11/9: pt recalled effortful swallow, performed after every few bites of food, with cueing. Pt then performed 5 repetitions outside of PO. Pt encouraged to cont to perform throughout the day and rationale. Pt stated understanding but will require reminders. Spouse not present at this time  Cont goal    Patient/Family/Caregiver Education:  As above    Compensatory Strategies:  No straws  Upright as possible for all oral intake  Small bites/sips    Plan:  Continued daily Dysphagia treatment with goals per  plan of care. Diet recommendations: dysphagia II/nectar thick liquids - no straw  Medications in puree  DC recommendation: pt would benefit from ongoing treatment upon dc  Treatment: 20  D/W nursing Jesus  Needs met prior to leaving room, call button in reach. Charlee Washington M.S./[facility]-SLP #[id number]  Pg.  # [phone]  If patient is discharged prior to next treatment, this note will serve as the discharge summary

## 2018-11-09 NOTE — PROGRESS NOTES
Electrophysiology - PROGRESS NOTE    Admit Date: 11/4/2018     Chief Complaint: pAF, HTN     Interval History:   Patient seen and examined and notes reviewed. Patient is being followed for pAF in the setting of PNA. No CP or SOB. No AF overnight. BP remains elevated - losartan increased yesterday.      In: 120 [P.O.:120]  Out: 375    Wt Readings from Last 2 Encounters:   11/09/18 118 lb 6.2 oz (53.7 kg)   10/16/18 122 lb (55.3 kg)         Data:   Scheduled Meds:   Scheduled Meds:   losartan-hydrochlorothiazide  1 tablet Oral Daily    mirtazapine  7.5 mg Oral Nightly    insulin glargine  15 Units Subcutaneous Nightly    insulin lispro  0-6 Units Subcutaneous TID WC    insulin lispro  0-3 Units Subcutaneous Nightly    ampicillin-sulbactam  3 g Intravenous Q6H    pantoprazole sodium  40 mg Oral QAM AC    ipratropium-albuterol  1 ampule Inhalation 4x daily    budesonide  0.5 mg Nebulization BID    buPROPion  150 mg Oral QAM    carvedilol  3.125 mg Oral BID    escitalopram  10 mg Oral Daily    levothyroxine  50 mcg Oral Daily    lipase-protease-amylase  1 capsule Oral 4x daily    prednisoLONE acetate  1 drop Both Eyes 4x Daily    simvastatin  10 mg Oral QPM    sodium chloride flush  10 mL Intravenous 2 times per day    enoxaparin  40 mg Subcutaneous Daily     Continuous Infusions:   dextrose       PRN Meds:.nitroGLYCERIN, sodium chloride flush, magnesium hydroxide, ondansetron, glucose, dextrose, glucagon (rDNA), dextrose  Continuous Infusions:   dextrose         Intake/Output Summary (Last 24 hours) at 11/09/18 0813  Last data filed at 11/09/18 0802   Gross per 24 hour   Intake              360 ml   Output              715 ml   Net             -355 ml       CBC:   Lab Results   Component Value Date    WBC 8.3 11/09/2018    HGB 10.3 11/09/2018     11/09/2018     BMP:  Lab Results   Component Value Date     11/09/2018    K 3.8 11/09/2018    CL 96 11/09/2018    CO2 31 11/09/2018    BUN 14 11/09/2018    CREATININE 0.8 11/09/2018    GLUCOSE 61 11/09/2018     INR:   Lab Results   Component Value Date    INR 1.18 01/24/2014        CARDIAC LABS  ENZYMES:No results for input(s): CKMB, CKMBINDEX, TROPONINI in the last 72 hours. Invalid input(s): CKTOTAL;3  FASTING LIPID PANEL:  Lab Results   Component Value Date    HDL 26 03/16/2016    LDLCALC 49 03/16/2016    TRIG 284 03/16/2016    TSH 2.81 06/15/2017     LIVER PROFILE:  Lab Results   Component Value Date    AST 18 11/04/2018    AST 16 06/18/2018    ALT 7 11/04/2018    ALT 7 06/18/2018       -----------------------------------------------------------------  Telemetry: Personally reviewed  NSR, HR ~ 60    Objective:   Vitals: BP (!) 179/89 Comment: MD schumacher  Pulse 70   Temp 97.6 °F (36.4 °C) (Axillary)   Resp 16   Ht 5' 8\" (1.727 m)   Wt 118 lb 6.2 oz (53.7 kg)   SpO2 95%   BMI 18.00 kg/m²   General appearance: alert, appears stated age and cooperative, No acute distress   Skin: Skin color, texture, turgor normal. No rashes or ecchymosis.   Neck: no JVD, supple, symmetrical, trachea midline   Lungs: , no accessory muscle use, no respiratory distress  Heart: reg rate and rhythm  Abdomen: soft, non-tender; bowel sounds normal  Extremities: No edema, DP +  Psychiatric: normal insight and affect    Patient Active Problem List:     Pneumonia of both lower lobes due to infectious organism [facility])     Cholelithiasis     Type 2 diabetes mellitus with hyperglycemia ([facility]     Essential hypertension     Skin lesion     Hypothyroidism due to acquired atrophy of thyroid     Mild cognitive impairment     Excessive cerumen in ear canal     Hearing loss     Vitamin D deficiency     Mixed hyperlipidemia     Weight loss, abnormal     Vitamin B 12 deficiency     Recurrent major depressive disorder, in partial remission (HCC)     Centrilobular emphysema (HCC)     Reactive depression     Severe malnutrition

## 2018-11-09 NOTE — PROGRESS NOTES
Called report to [facility] at [facility]. RN verbalized understanding of care instructions, medications and prescriptions. No questions at this time.

## 2018-11-09 NOTE — CARE COORDINATION
2018  [facility])  Clinical Case Management Department    Patient: Frankey Rank  MRN: [id number] / : 1935  ACCT: [de-identified]          Admission Documentation  Attending Provider: Karuna Andino MD  Admit date/time: 2018  6:44 PM  Status: Inpatient  Diagnosis: Hypertensive emergency     Readmission within last 30 days:  no     Living Situation  Discharge Planning  Living Arrangements: Spouse/Significant Other, Children  Support Systems: Spouse/Significant Other, Parent, Home Care Staff  Potential Assistance Needed: Home Care, TA/Passport, Emergency Call  System, Durable Medical Equipment  DME: Walker  Type of Home Care Services: Aide Services, Safety Alert  Patient expects to be discharged to[de-identified] home  Expected Discharge Date: 18    Service Assessment       Values / Beliefs  Do you have any ethnic, cultural, sacramental, or spiritual [employer] needs you would like us to be aware of while you are in the hospital?: No    Advance Directives (For Healthcare)  Pre-existing DNR Comfort Care/DNR Arrest/DNI Order: No  Healthcare Directive: No, patient does not have an advance directive for healthcare treatment                        Destination  skilled nursing facility     CM/ SW spoke with patient and wife at bedside. PC team met with patient and wife at bedside, agreeable to SNF at discharge and interested in [address] at [place] at discharge for short SNF stay. Durable Medical Equipment   Walker    Patient reports having a walker at home that he uses. Home Health/Skilled Nursing  Services at Discharge: SNF Physical Therapy, Occupational Therapy and Nursing daily  Home care at home? Yes Provider: [facility] (prior to admission.) Provider Phone: [phone]    Patient active with [facility] prior to admission. Home Medical Care  Patient and spouse manage home medical care prior to admission.      Pharmacy: [facility]
today's date (11/9/2018) and time (1130) on IMM letter #2 on the the appropriate lines. Patient aware that a copy of this IMM letter #2 is available prior to discharge from the paper chart on the unit. Electronic documentation has been entered into epic for IMM letter #2 and original paper copy has been added to the paper chart at the nurses station.      Care Transitions patient: Yes    Clifton Hines RN  [facility]  Case Management Department  Ph: [phone] Fax: [phone]

## 2018-11-26 NOTE — PROGRESS NOTES
disorder, in partial remission ([facility] 03/27/2018    Centrilobular emphysema ([facility] 03/27/2018    Vitamin B 12 deficiency 12/14/2016    Vitamin D deficiency 09/14/2016    Mixed hyperlipidemia 09/14/2016    Weight loss, abnormal 09/14/2016    Excessive cerumen in ear canal 05/17/2016    Hearing loss 05/17/2016    Mild cognitive impairment 03/11/2016    Hypothyroidism due to acquired atrophy of thyroid 12/20/2015    Type 2 diabetes mellitus with hyperglycemia [facility] 11/09/2015    Essential hypertension 11/09/2015    Skin lesion 11/09/2015    Cholelithiasis 11/01/2013    Pneumonia of both lower lobes due to infectious organism ([facility] 04/25/2013        Assessment:   1. Paroxysmal atrial fibrillation (HCC)    2. Palpitations    3. Essential hypertension      Cardiac Hx: Prosper Burnett a 81 y. o.man with a h/o HTN, HLD, DM, COPD, necrotizing pancreatitis who p/w resp distress, acute hypoxic resp failure on 11/4/18, developed an approx 2 hour episode of AF/AFL that converted to NSR spontaneously. Patient may have felt some palpitations. [id number] 4. TSH 2.46 (10/3/18).     AF  - ? Lone episode  - Echo showed a LA size of 2.9, volume of 34  - On carvedilol 3.125 mg twice a day - HR had been low in the hospital  - 30 day monitor to assess for burden  - Consider oral anticoagulation if recurrent AF - low dose apixaban (age > 80, weight < 132#)  - F/u with Dr. Colten Spring in 6-8 weeks. - ECG ordered and results personally reviewed      HTN  - Well controlled  - On carvedilol 3.125 mg - HR in the 60's  - On losartan/HCTZ 100/12.5, amlodipine 5 mg     EF of 61%  No systolic HF  No known CAD  No Anticoagulation for AF at this time  No tobacco use.      All questions and concerns were addressed to the patient/family. Alternatives to my treatment were discussed. The note was completed using EMR. Every effort was made to ensure accuracy; however, inadvertent computerized transcription errors may be present.

## 2019-01-25 ENCOUNTER — TELEPHONE (OUTPATIENT)
Dept: INTERNAL MEDICINE CLINIC | Age: 84
End: 2019-01-25

## 2019-02-12 ENCOUNTER — TELEPHONE (OUTPATIENT)
Dept: CARDIOLOGY CLINIC | Age: 84
End: 2019-02-12

## 2024-01-29 NOTE — PROGRESS NOTES
Abdomen: soft and nondistended, no visible masses, nontender to palpation, no guarding or rigidity, no rebound tenderness, no palpable masses, normal bowel sounds. Liver and Spleen: no hepatosplenomegaly, liver nontender. Rectal: Deferred (1.727 m)   Wt 134 lb 7.7 oz (61 kg)   SpO2 93%   BMI 20.45 kg/m²     General appearance: No apparent distress, appears stated age and cooperative. Thin looking, chronically ill-appearing  HEENT: Pupils equal, round, and reactive to light. Conjunctivae/corneas clear. Respiratory:  Normal respiratory effort. Diffuse crackles bilaterally. Cardiovascular: Regular rate and rhythm with normal S1/S2 without murmurs, rubs or gallops. Abdomen: Soft, non-tender, non-distended with normal bowel sounds. Musculoskeletal: No clubbing, cyanosis or edema bilaterally. Neurologic:  Alert and oriented. Neurovascularly intact without any focal sensory/motor deficits. Cranial nerves: II-XII intact, grossly non-focal.  Peripheral Pulses: +2 palpable, equal bilaterally       Labs:   Recent Labs      11/04/18 1909 11/05/18   0620  11/06/18   0500   WBC  14.0*  15.7*  16.9*   HGB  11.9*  11.3*  11.4*   HCT  36.2*  35.1*  34.9*   PLT  366  260  221     Recent Labs      11/04/18 1909 11/05/18   0620  11/06/18   0500   NA  131*  131*  131*   K  3.5  4.9  3.7   CL  87*  92*  92*   CO2  32  25  26   BUN  14  15  20   CREATININE  0.9  0.9  0.9   CALCIUM  8.8  8.2*  8.4     Recent Labs      11/04/18 1909   AST  18   ALT  7*   BILIDIR  <0.2   BILITOT  0.8   ALKPHOS  82     No results for input(s): INR in the last 72 hours. Recent Labs      11/04/18   1905 11/04/18 1909   TROPONINI  0.00  0.02*       Urinalysis:    Lab Results   Component Value Date    NITRU Negative 06/18/2018    WBCUA 3-5 06/18/2018    BACTERIA 1+ 06/18/2018    RBCUA 3-5 06/18/2018    BLOODU MODERATE 06/18/2018    SPECGRAV >=1.030 06/18/2018    GLUCOSEU Negative 06/18/2018       Radiology:  FL MODIFIED BARIUM SWALLOW W VIDEO   Final Result      Abnormal modified barium swallow with tracheal aspiration of thin barium as above. Please refer to the Speech Pathologist report for dietary recommendations.          CTA CHEST W CONTRAST   Final Result      No CT